# Patient Record
Sex: MALE | Race: WHITE | NOT HISPANIC OR LATINO | Employment: OTHER | URBAN - NONMETROPOLITAN AREA
[De-identification: names, ages, dates, MRNs, and addresses within clinical notes are randomized per-mention and may not be internally consistent; named-entity substitution may affect disease eponyms.]

---

## 2017-01-17 ENCOUNTER — OFFICE VISIT (OUTPATIENT)
Dept: CARDIOLOGY | Facility: CLINIC | Age: 67
End: 2017-01-17

## 2017-01-17 VITALS
WEIGHT: 241.8 LBS | OXYGEN SATURATION: 96 % | HEART RATE: 68 BPM | SYSTOLIC BLOOD PRESSURE: 128 MMHG | DIASTOLIC BLOOD PRESSURE: 78 MMHG | HEIGHT: 71 IN | BODY MASS INDEX: 33.85 KG/M2

## 2017-01-17 DIAGNOSIS — E78.2 MIXED HYPERLIPIDEMIA: ICD-10-CM

## 2017-01-17 DIAGNOSIS — Z79.899 ENCOUNTER FOR MONITORING SOTALOL THERAPY: ICD-10-CM

## 2017-01-17 DIAGNOSIS — E66.9 NON MORBID OBESITY, UNSPECIFIED OBESITY TYPE: ICD-10-CM

## 2017-01-17 DIAGNOSIS — I48.0 PAROXYSMAL A-FIB (HCC): Primary | ICD-10-CM

## 2017-01-17 DIAGNOSIS — Z51.81 ENCOUNTER FOR MONITORING SOTALOL THERAPY: ICD-10-CM

## 2017-01-17 DIAGNOSIS — I10 ESSENTIAL HYPERTENSION: ICD-10-CM

## 2017-01-17 PROCEDURE — 99214 OFFICE O/P EST MOD 30 MIN: CPT | Performed by: INTERNAL MEDICINE

## 2017-01-17 PROCEDURE — 93000 ELECTROCARDIOGRAM COMPLETE: CPT | Performed by: INTERNAL MEDICINE

## 2017-01-17 NOTE — PROGRESS NOTES
subjective     Chief Complaint   Patient presents with   • Atrial Fibrillation   • Hypertension   • Hyperlipidemia     History of Present Illness    Paroxysmal atrial fibrillation. Patient was diagnosed as having atrial fibrillation in [place] in 2006. Patient was started on sotalol therapy. No cardioversion was needed. He has been doing very well. He states that when he gets tired he starts feeling fluttering in the chest last episode of heart fluttering was about 2 weeks ago it lasts for 45 minutes and then returns back to normal. Patient's chads 2 VASC score is 3. There has been no syncopal episode denies any chest pain. His last stress test was in 2012 and was negative for significant exercise-induced myocardial ischemia. Echocardiogram showed normal LV functions there was no significant valvular heart disease LV ejection fraction was 65%. LV diastolic dysfunction was noted. RV systolic pressure was 40. At this time patient is asymptomatic      HYPERTENSION  Miki Jett has long-standing essential hypertension for years. he is taking medications regularly. There are no medication side effects. Blood pressure is very well controlled. There has been no headache nausea chest pain. There has been no syncopal or presyncopal episode. he denies episodes of hypo-tension or accelerated hypertension.    Hyperlipidemia  Miki Jett has long-standing history of hyperlipidemia. Has been trying to lose weight following diet and exercise. Patient is tolerating medications very well. There has been no side effects. Latest lipid levels have been good.    Patient Active Problem List   Diagnosis   • Paroxysmal a-fib February 2006 leg [facility] currently in sinus rhythm.[id number] 3   • Encounter for monitoring sotalol therapy   • Hypertension   • Hyperlipidemia   • Obesity       Social History   Substance Use Topics   • Smoking status: Former Smoker     Packs/day: 0.50     Years: 3.00     Types:  "Cigarettes     Quit date: 2006   • Smokeless tobacco: Former User     Types: Chew   • Alcohol use No       No Known Allergies      Current Outpatient Prescriptions:   •  apixaban (ELIQUIS) 5 MG tablet tablet, Take 1 tablet by mouth 2 (Two) Times a Day., Disp: 60 tablet, Rfl: 2  •  lisinopril (PRINIVIL,ZESTRIL) 10 MG tablet, Take 10 mg by mouth daily., Disp: , Rfl:   •  metFORMIN (GLUCOPHAGE) 500 MG tablet, Take 500 mg by mouth Daily With Breakfast., Disp: , Rfl:   •  sertraline (ZOLOFT) 100 MG tablet, Take 1 tablet by mouth Daily., Disp: 30 tablet, Rfl: 2  •  sildenafil (REVATIO) 20 MG tablet, Take 20 mg by mouth daily., Disp: , Rfl:   •  sotalol (BETAPACE) 80 MG tablet, Take 80 mg by mouth 2 (two) times a day., Disp: , Rfl:       The following portions of the patient's history were reviewed and updated as appropriate: allergies, current medications, past family history, past medical history, past social history, past surgical history and problem list.    Review of Systems   Constitution: Negative.   HENT: Negative.    Eyes: Negative.    Cardiovascular: Negative.    Respiratory: Negative.    Hematologic/Lymphatic: Negative.    Musculoskeletal: Negative.    Gastrointestinal: Negative.    Neurological: Negative.           Objective:     Visit Vitals   • /78 (BP Location: Left arm, Patient Position: Sitting)   • Pulse 68   • Ht 71\" (180.3 cm)   • Wt 241 lb 12.8 oz (110 kg)   • SpO2 96%   • BMI 33.72 kg/m2     Physical Exam   Constitutional: He appears well-developed and well-nourished.   HENT:   Head: Normocephalic and atraumatic.   Mouth/Throat: Oropharynx is clear and moist.   Eyes: Conjunctivae and EOM are normal. Pupils are equal, round, and reactive to light. No scleral icterus.   Neck: Normal range of motion. Neck supple. No JVD present. No tracheal deviation present. No thyromegaly present.   Cardiovascular: Normal rate, regular rhythm, normal heart sounds and intact distal pulses.  Exam reveals no " "friction rub.    No murmur heard.  Pulmonary/Chest: Effort normal and breath sounds normal. No respiratory distress. He has no wheezes. He has no rales. He exhibits no tenderness.   Abdominal: Soft. Bowel sounds are normal. He exhibits no distension and no mass. There is no tenderness. There is no rebound and no guarding.   Musculoskeletal: Normal range of motion. He exhibits no edema, tenderness or deformity.   Lymphadenopathy:     He has no cervical adenopathy.   Neurological: He is alert. He has normal reflexes. No cranial nerve deficit. He exhibits normal muscle tone. Coordination normal.   Skin: Skin is warm and dry.   Psychiatric: He has a normal mood and affect. His behavior is normal. Judgment and thought content normal.         Lab Review        ECG 12 Lead  Date/Time: 1/17/2017 12:59 PM  Performed by: NOLBERTO NICHOLSON  Authorized by: NOLBERTO NICHOLSON   Rhythm: sinus rhythm  Rate: normal  Conduction: conduction normal  ST Segments: ST segments normal  T Waves: T waves normal  QRS axis: normal  Other: no other findings  Clinical impression: normal ECG              I personally viewed and interpreted the patient's LAB data         Assessment:     1. Paroxysmal a-fib February 2006 leg [facility] currently in sinus rhythm.[id number] 3    2. Essential hypertension    3. Mixed hyperlipidemia    4. Non morbid obesity, unspecified obesity type    5. Encounter for monitoring sotalol therapy          Plan:      Patient is doing very well.  He walked 5 miles today and last week he walked 7.5 miles.  Patient is trying to \"train his dog.  Patient is very active and is asymptomatic.    Risk factor modification and further weight loss was stressed.  He will continue his current medications.    Blood pressure is normal  Patient is tolerating Betapace and maintaining in sinus rhythm  He is not having any problems with anticoagulation with eliquis.    The nature of cardiac risk has been fully " discussed with this patient. I have made him aware of his LDL target goal given his cardiovascular risk analysis. I have discussed the appropriate diet. The need for lifelong compliance in order to reduce risk is stressed. A regular exercise program is recommended to help achieve and maintain normal body weight, fitness and improve lipid balance.      No Follow-up on file.

## 2017-01-17 NOTE — LETTER
January 17, 2017     Blanka Grimm, [place]  [address] [place] [address]    Patient: Miki Jett   YOB: 1950   Date of Visit: 1/17/2017       Dear Dr. Grimm, [place]:    Thank you for referring Miki Jett to me for evaluation. Below are the relevant portions of my assessment and plan of care.    If you have questions, please do not hesitate to call me. I look forward to following Miki along with you.         Sincerely,        Mike Olson MD        CC: No Recipients  Mike Olson MD  1/17/2017  1:00 PM  Signed  subjective     Chief Complaint   Patient presents with   • Atrial Fibrillation   • Hypertension   • Hyperlipidemia     History of Present Illness    Paroxysmal atrial fibrillation. Patient was diagnosed as having atrial fibrillation in [place] in 2006. Patient was started on sotalol therapy. No cardioversion was needed. He has been doing very well. He states that when he gets tired he starts feeling fluttering in the chest last episode of heart fluttering was about 2 weeks ago it lasts for 45 minutes and then returns back to normal. Patient's chads 2 VASC score is 3. There has been no syncopal episode denies any chest pain. His last stress test was in 2012 and was negative for significant exercise-induced myocardial ischemia. Echocardiogram showed normal LV functions there was no significant valvular heart disease LV ejection fraction was 65%. LV diastolic dysfunction was noted. RV systolic pressure was 40. At this time patient is asymptomatic      HYPERTENSION  Miki Jett has long-standing essential hypertension for years. he is taking medications regularly. There are no medication side effects. Blood pressure is very well controlled. There has been no headache nausea chest pain. There has been no syncopal or presyncopal episode. he denies episodes of hypo-tension or accelerated hypertension.    Hyperlipidemia  Miki Jett has  "long-standing history of hyperlipidemia. Has been trying to lose weight following diet and exercise. Patient is tolerating medications very well. There has been no side effects. Latest lipid levels have been good.    Patient Active Problem List   Diagnosis   • Paroxysmal a-fib February 2006 leg [facility] currently in sinus rhythm.[id number] 3   • Encounter for monitoring sotalol therapy   • Hypertension   • Hyperlipidemia   • Obesity       Social History   Substance Use Topics   • Smoking status: Former Smoker     Packs/day: 0.50     Years: 3.00     Types: Cigarettes     Quit date: 2006   • Smokeless tobacco: Former User     Types: Chew   • Alcohol use No       No Known Allergies      Current Outpatient Prescriptions:   •  apixaban (ELIQUIS) 5 MG tablet tablet, Take 1 tablet by mouth 2 (Two) Times a Day., Disp: 60 tablet, Rfl: 2  •  lisinopril (PRINIVIL,ZESTRIL) 10 MG tablet, Take 10 mg by mouth daily., Disp: , Rfl:   •  metFORMIN (GLUCOPHAGE) 500 MG tablet, Take 500 mg by mouth Daily With Breakfast., Disp: , Rfl:   •  sertraline (ZOLOFT) 100 MG tablet, Take 1 tablet by mouth Daily., Disp: 30 tablet, Rfl: 2  •  sildenafil (REVATIO) 20 MG tablet, Take 20 mg by mouth daily., Disp: , Rfl:   •  sotalol (BETAPACE) 80 MG tablet, Take 80 mg by mouth 2 (two) times a day., Disp: , Rfl:       The following portions of the patient's history were reviewed and updated as appropriate: allergies, current medications, past family history, past medical history, past social history, past surgical history and problem list.    Review of Systems   Constitution: Negative.   HENT: Negative.    Eyes: Negative.    Cardiovascular: Negative.    Respiratory: Negative.    Hematologic/Lymphatic: Negative.    Musculoskeletal: Negative.    Gastrointestinal: Negative.    Neurological: Negative.           Objective:     Visit Vitals   • /78 (BP Location: Left arm, Patient Position: Sitting)   • Pulse 68   • Ht 71\" (180.3 " cm)   • Wt 241 lb 12.8 oz (110 kg)   • SpO2 96%   • BMI 33.72 kg/m2     Physical Exam   Constitutional: He appears well-developed and well-nourished.   HENT:   Head: Normocephalic and atraumatic.   Mouth/Throat: Oropharynx is clear and moist.   Eyes: Conjunctivae and EOM are normal. Pupils are equal, round, and reactive to light. No scleral icterus.   Neck: Normal range of motion. Neck supple. No JVD present. No tracheal deviation present. No thyromegaly present.   Cardiovascular: Normal rate, regular rhythm, normal heart sounds and intact distal pulses.  Exam reveals no friction rub.    No murmur heard.  Pulmonary/Chest: Effort normal and breath sounds normal. No respiratory distress. He has no wheezes. He has no rales. He exhibits no tenderness.   Abdominal: Soft. Bowel sounds are normal. He exhibits no distension and no mass. There is no tenderness. There is no rebound and no guarding.   Musculoskeletal: Normal range of motion. He exhibits no edema, tenderness or deformity.   Lymphadenopathy:     He has no cervical adenopathy.   Neurological: He is alert. He has normal reflexes. No cranial nerve deficit. He exhibits normal muscle tone. Coordination normal.   Skin: Skin is warm and dry.   Psychiatric: He has a normal mood and affect. His behavior is normal. Judgment and thought content normal.         Lab Review        ECG 12 Lead  Date/Time: 1/17/2017 12:59 PM  Performed by: NOLBERTO NICHOLSON  Authorized by: NOLBERTO NICHOLSON   Rhythm: sinus rhythm  Rate: normal  Conduction: conduction normal  ST Segments: ST segments normal  T Waves: T waves normal  QRS axis: normal  Other: no other findings  Clinical impression: normal ECG              I personally viewed and interpreted the patient's LAB data         Assessment:     1. Paroxysmal a-fib February 2006 leg [facility] currently in sinus rhythm.[id number] 3    2. Essential hypertension    3. Mixed hyperlipidemia    4. Non morbid  "obesity, unspecified obesity type    5. Encounter for monitoring sotalol therapy          Plan:      Patient is doing very well.  He walked 5 miles today and last week he walked 7.5 miles.  Patient is trying to \"train his dog.  Patient is very active and is asymptomatic.    Risk factor modification and further weight loss was stressed.  He will continue his current medications.    Blood pressure is normal  Patient is tolerating Betapace and maintaining in sinus rhythm  He is not having any problems with anticoagulation with eliquis.    The nature of cardiac risk has been fully discussed with this patient. I have made him aware of his LDL target goal given his cardiovascular risk analysis. I have discussed the appropriate diet. The need for lifelong compliance in order to reduce risk is stressed. A regular exercise program is recommended to help achieve and maintain normal body weight, fitness and improve lipid balance.      No Follow-up on file.  "

## 2017-01-17 NOTE — MR AVS SNAPSHOT
Miki Jett   1/17/2017 8:30 AM   Office Visit    Dept Phone:  [phone]   Encounter #:  [id number]    Provider:  Mike Olson MD   Department:  [facility] CARDIOLOGY                Your Full Care Plan              Today's Medication Changes          These changes are accurate as of: 1/17/17  9:44 AM.  If you have any questions, ask your nurse or doctor.               Medication(s)that have changed:     metFORMIN 500 MG tablet   Commonly known as:  GLUCOPHAGE   Take 500 mg by mouth Daily With Breakfast.   What changed:  Another medication with the same name was removed. Continue taking this medication, and follow the directions you see here.   Changed by:  Mike Olson MD         Stop taking medication(s)listed here:     fluticasone 50 MCG/ACT nasal spray   Commonly known as:  FLONASE   Stopped by:  Mike Olson MD                      Your Updated Medication List          This list is accurate as of: 1/17/17  9:44 AM.  Always use your most recent med list.                apixaban 5 MG tablet tablet   Commonly known as:  ELIQUIS   Take 1 tablet by mouth 2 (Two) Times a Day.       lisinopril 10 MG tablet   Commonly known as:  PRINIVIL,ZESTRIL       metFORMIN 500 MG tablet   Commonly known as:  GLUCOPHAGE       REVATIO 20 MG tablet   Generic drug:  sildenafil       sertraline 100 MG tablet   Commonly known as:  ZOLOFT   Take 1 tablet by mouth Daily.       sotalol 80 MG tablet   Commonly known as:  BETAPACE               We Performed the Following     ECG 12 Lead       You Were Diagnosed With        Codes Comments    Paroxysmal a-fib    -  Primary ICD-10-CM: I48.0  ICD-9-CM: 427.31     Essential hypertension     ICD-10-CM: I10  ICD-9-CM: 401.9     Mixed hyperlipidemia     ICD-10-CM: E78.2  ICD-9-CM: 272.2     Non morbid obesity, unspecified obesity type     ICD-10-CM: E66.9  ICD-9-CM: 278.00     Encounter for monitoring sotalol therapy     " ICD-10-CM: Z51.81, Z79.899  ICD-9-CM: V58.83, V58.69       Instructions     None    Patient Instructions History      Upcoming Appointments     Visit Type Date Time Department    FOLLOW UP 1/17/2017  8:30 AM [facility] CARDIOLOGY [place]    FOLLOW UP 6/27/2017  9:00 AM [facility] CARDIOLOGY [place]      MyChart Signup     Our records indicate that you have declined [facility] sign[facility]. If you would like to sign up for [facility], please email [email or web address] or call [phone] to obtain an activation code.             Other Info from Your Visit           Your Appointments     Jun 27, 2017  9:00 AM EDT   Follow Up with Mike Olson MD   [facility] CARDIOLOGY (--)    [address] [place] [address] [place]  [place] [address] [phone]           Arrive 15 minutes prior to appointment.              Allergies     No Known Allergies      Reason for Visit     Atrial Fibrillation     Hypertension     Hyperlipidemia           Vital Signs     Blood Pressure Pulse Height Weight Oxygen Saturation Body Mass Index    128/78 (BP Location: Left arm, Patient Position: Sitting) 68 71\" (180.3 cm) 241 lb 12.8 oz (110 kg) 96% 33.72 kg/m2    Smoking Status                   Former Smoker           Problems and Diagnoses Noted     Encounter for monitoring sotalol therapy    High cholesterol or triglycerides    High blood pressure    Obesity    Atrial fibrillation (irregular heartbeat)        "

## 2017-03-16 NOTE — TELEPHONE ENCOUNTER
Request rx.   Patient was last seen in October and no showed for his appointment in January 2017 with no pending appointments.

## 2017-03-17 ENCOUNTER — TELEPHONE (OUTPATIENT)
Dept: PSYCHIATRY | Facility: CLINIC | Age: 67
End: 2017-03-17

## 2017-03-20 RX ORDER — SERTRALINE HYDROCHLORIDE 100 MG/1
100 TABLET, FILM COATED ORAL DAILY
Qty: 30 TABLET | Refills: 2 | Status: SHIPPED | OUTPATIENT
Start: 2017-03-20 | End: 2017-06-16 | Stop reason: SDUPTHER

## 2017-03-27 ENCOUNTER — OFFICE VISIT (OUTPATIENT)
Dept: PSYCHIATRY | Facility: CLINIC | Age: 67
End: 2017-03-27

## 2017-03-27 VITALS
DIASTOLIC BLOOD PRESSURE: 83 MMHG | HEART RATE: 74 BPM | WEIGHT: 245 LBS | HEIGHT: 71 IN | BODY MASS INDEX: 34.3 KG/M2 | SYSTOLIC BLOOD PRESSURE: 133 MMHG

## 2017-03-27 DIAGNOSIS — F41.1 GENERALIZED ANXIETY DISORDER: Primary | ICD-10-CM

## 2017-03-27 PROCEDURE — 99212 OFFICE O/P EST SF 10 MIN: CPT | Performed by: PSYCHIATRY & NEUROLOGY

## 2017-03-27 NOTE — PROGRESS NOTES
"Subjective   Patient ID: Miki Jett is a 66 y.o. male is here today for follow-up.     /83  Pulse 74  Ht 71\" (180.3 cm)  Wt 245 lb (111 kg)  BMI 34.17 kg/m2    Review of patient's allergies indicates no known allergies.    History of Present Illness The patient states that he is feeling good and his mood has been stable. He states that she keeps himself busy hunting and fishing and is enjoying his [facility]. Reports compliance with his medication and no side effects.    The following portions of the patient's history were reviewed and updated as appropriate: allergies, current medications and problem list.    Review of Systems   Constitutional: Negative.    HENT: Negative.    Eyes: Negative.    Respiratory: Negative.    Cardiovascular: Negative.    Gastrointestinal: Negative.        Objective   Mental Status Exam  Appearance:  clean and casually dressed, appropriate  Attitude toward clinician:  cooperative and agreeable   Speech:    Rate:  regular rate and rhythm   Volume:  normal  Motor:  no abnormal movements present  Mood:  Good  Affect:  euthymic  Thought Processes:  linear, logical, and goal directed  Thought Content:  normal  Suicidal Thoughts:  absent  Homicidal Thoughts:  absent  Perceptual Disturbance: no perceptual disturbance  Attention and Concentration:  good  Insight and Judgement:  good  Memory:  memory appears to be intact    MEDICATION ISSUES:  Current Outpatient Prescriptions on File Prior to Visit   Medication Sig Dispense Refill   • apixaban (ELIQUIS) 5 MG tablet tablet Take 1 tablet by mouth 2 (Two) Times a Day. 60 tablet 2   • lisinopril (PRINIVIL,ZESTRIL) 10 MG tablet Take 10 mg by mouth daily.     • metFORMIN (GLUCOPHAGE) 500 MG tablet Take 500 mg by mouth Daily With Breakfast.     • sertraline (ZOLOFT) 100 MG tablet Take 1 tablet by mouth Daily. 30 tablet 2   • sotalol (BETAPACE) 80 MG tablet Take 80 mg by mouth 2 (two) times a day.     • [DISCONTINUED] sildenafil (REVATIO) " 20 MG tablet Take 20 mg by mouth daily.       No current facility-administered medications on file prior to visit.          Lab Review:   No visits with results within 2 Month(s) from this visit.  Latest known visit with results is:    Admission on 03/02/2015, Discharged on 03/02/2015   Component Date Value   • Sodium 03/02/2015 140    • Potassium 03/02/2015 4.6    • Chloride 03/02/2015 105    • CO2 03/02/2015 25.2    • Glucose 03/02/2015 126*   • BUN 03/02/2015 22*   • Creatinine 03/02/2015 0.84    • Calcium 03/02/2015 9.7    • AST (SGOT) 03/02/2015 21    • Alkaline Phosphatase 03/02/2015 83    • Total Bilirubin 03/02/2015 0.3    • ALT (SGPT) 03/02/2015 25    • A/G Ratio 03/02/2015 1.3*   • Osmolality 03/02/2015 284    • BUN/Creatinine Ratio 03/02/2015 26.5    • Anion Gap 03/02/2015 9.8    • Albumin 03/02/2015 4.2    • Total Protein 03/02/2015 7.4    • eGFR 03/02/2015 97    • Fasting? 03/02/2015 YES    • Total Cholesterol 03/02/2015 166    • Triglycerides 03/02/2015 324*   • HDL Cholesterol 03/02/2015 38*   • VLDL Cholesterol 03/02/2015 65*   • LDL Cholesterol  03/02/2015 63    • TSH 03/02/2015 3.282    • WBC 03/02/2015 9.3    • RBC 03/02/2015 4.87    • Hemoglobin 03/02/2015 14.4    • Hematocrit 03/02/2015 43.9    • MCV 03/02/2015 90.1    • MCH 03/02/2015 29.6    • MCHC 03/02/2015 32.8*   • Platelets 03/02/2015 223    • RDW 03/02/2015 14.7*   • MPV 03/02/2015 10.6*   • Neutrophil Rel % 03/02/2015 69.0    • Lymphocyte Rel % 03/02/2015 16.5    • Monocyte Rel % 03/02/2015 12.2*   • Eosinophil Rel % 03/02/2015 1.7    • Basophil Rel % 03/02/2015 0.3    • Immature Granulocyte Rel* 03/02/2015 0.30    • Neutrophils Absolute 03/02/2015 6.4    • Lymphocytes Absolute 03/02/2015 1.5    • Monocytes Absolute 03/02/2015 1.1*   • Eosinophils Absolute 03/02/2015 0.2    • Basophils Absolute 03/02/2015 0.0    • Abs Imm Gran 03/02/2015 0.030      Assessment/Plan   Diagnoses and all orders for this visit:    Generalized anxiety  disorder    Return in about 3 months (around 6/27/2017).

## 2017-06-16 RX ORDER — SERTRALINE HYDROCHLORIDE 100 MG/1
100 TABLET, FILM COATED ORAL DAILY
Qty: 30 TABLET | Refills: 2 | Status: SHIPPED | OUTPATIENT
Start: 2017-06-16 | End: 2017-07-24 | Stop reason: SDUPTHER

## 2017-07-14 ENCOUNTER — OFFICE VISIT (OUTPATIENT)
Dept: CARDIOLOGY | Facility: CLINIC | Age: 67
End: 2017-07-14

## 2017-07-14 VITALS
RESPIRATION RATE: 18 BRPM | WEIGHT: 239 LBS | DIASTOLIC BLOOD PRESSURE: 70 MMHG | SYSTOLIC BLOOD PRESSURE: 110 MMHG | OXYGEN SATURATION: 95 % | BODY MASS INDEX: 33.46 KG/M2 | HEART RATE: 75 BPM | HEIGHT: 71 IN

## 2017-07-14 DIAGNOSIS — Z51.81 ENCOUNTER FOR MONITORING SOTALOL THERAPY: ICD-10-CM

## 2017-07-14 DIAGNOSIS — E66.9 NON MORBID OBESITY, UNSPECIFIED OBESITY TYPE: ICD-10-CM

## 2017-07-14 DIAGNOSIS — I48.0 PAROXYSMAL A-FIB (HCC): Primary | ICD-10-CM

## 2017-07-14 DIAGNOSIS — Z79.899 ENCOUNTER FOR MONITORING SOTALOL THERAPY: ICD-10-CM

## 2017-07-14 DIAGNOSIS — I10 ESSENTIAL HYPERTENSION: ICD-10-CM

## 2017-07-14 PROCEDURE — 99213 OFFICE O/P EST LOW 20 MIN: CPT | Performed by: INTERNAL MEDICINE

## 2017-07-14 RX ORDER — LORAZEPAM 1 MG/1
TABLET ORAL
COMMUNITY
Start: 2017-04-11 | End: 2021-12-01

## 2017-07-14 NOTE — PROGRESS NOTES
subjective     Chief Complaint   Patient presents with   • Hypertension   • Hyperlipidemia   • Atrial Fibrillation     History of Present Illness  Paroxysmal atrial fibrillation. Patient was diagnosed as having atrial fibrillation in [place] in 2006. Patient was started on sotalol therapy. No cardioversion was needed. He has been doing very well. He states that when he gets tired he starts feeling fluttering in the chest last episode of heart fluttering was about 2 weeks ago it lasts for 45 minutes and then returns back to normal. Patient's chads 2 VASC score is 3. There has been no syncopal episode denies any chest pain. His last stress test was in 2012 and was negative for significant exercise-induced myocardial ischemia. Echocardiogram showed normal LV functions there was no significant valvular heart disease LV ejection fraction was 65%. LV diastolic dysfunction was noted. RV systolic pressure was 40. At this time patient is asymptomatic.    Chronic anticoagulation  Patient has paroxysmal atrial fibrillation currently in sinus rhythm.  Chads 2 VASC score is 3.  Patient is using eliquis 5 twice a day without any side effects.       HYPERTENSION  Miki Jett has long-standing essential hypertension for years. he is taking medications regularly. There are no medication side effects. Blood pressure is very well controlled. There has been no headache nausea chest pain. There has been no syncopal or presyncopal episode. he denies episodes of hypo-tension or accelerated hypertension.     Hyperlipidemia  Miki Jett has long-standing history of hyperlipidemia. Has been trying to lose weight following diet and exercise. Patient is tolerating medications very well. There has been no side effects. Latest lipid levels have been good.    Past Surgical History:   Procedure Laterality Date   • CARDIOVASCULAR STRESS TEST  2012   • CATARACT EXTRACTION     • ECHO - CONVERTED  2012     Family History   Problem  Relation Age of Onset   • No Known Problems Mother    • Lung cancer Father    • Heart disease Sister      Past Medical History:   Diagnosis Date   • Anxiety    • Encounter for monitoring sotalol therapy    • Hyperlipidemia    • Hypertension    • Obesity    • Paroxysmal a-fib      Patient Active Problem List   Diagnosis   • Paroxysmal a-fib February 2006 leg [facility] Epifanio currently in sinus rhythm.[id number]   • Encounter for monitoring sotalol therapy   • Hypertension   • Hyperlipidemia   • Obesity       Social History   Substance Use Topics   • Smoking status: Former Smoker     Packs/day: 0.50     Years: 3.00     Types: Cigarettes     Quit date: 2006   • Smokeless tobacco: Former User     Types: Chew   • Alcohol use No       No Known Allergies    Current Outpatient Prescriptions on File Prior to Visit   Medication Sig   • apixaban (ELIQUIS) 5 MG tablet tablet Take 1 tablet by mouth 2 (Two) Times a Day.   • lisinopril (PRINIVIL,ZESTRIL) 10 MG tablet Take 10 mg by mouth daily.   • metFORMIN (GLUCOPHAGE) 500 MG tablet Take 500 mg by mouth Daily With Breakfast.   • sertraline (ZOLOFT) 100 MG tablet Take 1 tablet by mouth Daily.   • sotalol (BETAPACE) 80 MG tablet Take 80 mg by mouth 2 (two) times a day.     No current facility-administered medications on file prior to visit.          The following portions of the patient's history were reviewed and updated as appropriate: allergies, current medications, past family history, past medical history, past social history, past surgical history and problem list.    Review of Systems   Constitution: Negative.   HENT: Negative.  Negative for congestion and headaches.    Eyes: Negative.    Cardiovascular: Negative.  Negative for chest pain, cyanosis, dyspnea on exertion, irregular heartbeat, leg swelling, near-syncope, orthopnea, palpitations, paroxysmal nocturnal dyspnea and syncope.   Respiratory: Negative.  Negative for shortness of breath.   "  Hematologic/Lymphatic: Negative.    Musculoskeletal: Negative.    Gastrointestinal: Negative.    Neurological: Negative.           Objective:     /70 (BP Location: Left arm, Patient Position: Sitting, Cuff Size: Adult)  Pulse 75  Resp 18  Ht 71\" (180.3 cm)  Wt 239 lb (108 kg)  SpO2 95%  BMI 33.33 kg/m2  Physical Exam   Constitutional: He appears well-developed and well-nourished.   HENT:   Head: Normocephalic and atraumatic.   Mouth/Throat: Oropharynx is clear and moist.   Eyes: Conjunctivae and EOM are normal. Pupils are equal, round, and reactive to light. No scleral icterus.   Neck: Normal range of motion. Neck supple. No JVD present. No tracheal deviation present. No thyromegaly present.   Cardiovascular: Normal rate, regular rhythm, normal heart sounds and intact distal pulses.  Exam reveals no friction rub.    No murmur heard.  Pulmonary/Chest: Effort normal and breath sounds normal. No respiratory distress. He has no wheezes. He has no rales. He exhibits no tenderness.   Abdominal: Soft. Bowel sounds are normal. He exhibits no distension and no mass. There is no tenderness. There is no rebound and no guarding.   Musculoskeletal: Normal range of motion. He exhibits no edema, tenderness or deformity.   Lymphadenopathy:     He has no cervical adenopathy.   Neurological: He is alert. He has normal reflexes. No cranial nerve deficit. He exhibits normal muscle tone. Coordination normal.   Skin: Skin is warm and dry.   Psychiatric: He has a normal mood and affect. His behavior is normal. Judgment and thought content normal.         Lab Review  Lab work as of December 5, 2016  Normal CBC  Cholesterol 142 triglyceride 209 HDL 39 and LDL 61  Blood sugar 168 otherwise CBC CMP normal    Procedures       I personally viewed and interpreted the patient's LAB data         Assessment:     1. Paroxysmal a-fib February 2006 leg [facility] currently in sinus rhythm.[id number]    2. Essential " hypertension    3. Non morbid obesity, unspecified obesity type    4. Encounter for monitoring sotalol therapy          Plan:      Patient is planning to have colonoscopy.  He was advised to stop eliquis 3 days prior to colonoscopy and he can resume when it is okay with the gastroenterologist.    Patient needs to lose weight healthy lifestyle discussed.    Blood pressure is very well controlled.    Lab work discussed with the patient cholesterol is acceptable but sugar and triglycerides are high dietary restriction discussed weight loss exercise program healthy lifestyle emphasized.  Follow-up in 6 months Will get a copy of labs from PCP    Return in about 6 months (around 1/14/2018).

## 2017-07-24 ENCOUNTER — OFFICE VISIT (OUTPATIENT)
Dept: PSYCHIATRY | Facility: CLINIC | Age: 67
End: 2017-07-24

## 2017-07-24 VITALS
HEIGHT: 71 IN | WEIGHT: 239 LBS | HEART RATE: 72 BPM | DIASTOLIC BLOOD PRESSURE: 75 MMHG | BODY MASS INDEX: 33.46 KG/M2 | SYSTOLIC BLOOD PRESSURE: 119 MMHG

## 2017-07-24 DIAGNOSIS — F41.1 GENERALIZED ANXIETY DISORDER: Primary | ICD-10-CM

## 2017-07-24 PROCEDURE — 99212 OFFICE O/P EST SF 10 MIN: CPT | Performed by: PSYCHIATRY & NEUROLOGY

## 2017-07-24 RX ORDER — SERTRALINE HYDROCHLORIDE 100 MG/1
100 TABLET, FILM COATED ORAL DAILY
Qty: 30 TABLET | Refills: 2 | Status: SHIPPED | OUTPATIENT
Start: 2017-07-24 | End: 2017-10-23 | Stop reason: SDUPTHER

## 2017-07-24 NOTE — PROGRESS NOTES
"Subjective   Patient ID: Miki Jett is a 66 y.o. male is here today for follow-up.     /75  Pulse 72  Ht 71\" (180.3 cm)  Wt 239 lb (108 kg)  BMI 33.33 kg/m2    Review of patient's allergies indicates no known allergies.    History of Present Illness The patient states that he is doing well and his mood has been good and stable. Reports compliance with his medication and no side effects.    The following portions of the patient's history were reviewed and updated as appropriate: allergies, current medications and problem list.    Review of Systems   Constitutional: Negative.    HENT: Negative.    Eyes: Negative.    Respiratory: Negative.    Cardiovascular: Negative.    Gastrointestinal: Negative.        Objective   Mental Status Exam  Appearance:  clean and casually dressed, appropriate  Attitude toward clinician:  cooperative and agreeable   Speech:    Rate:  regular rate and rhythm   Volume:  normal  Motor:  no abnormal movements present  Mood:  Good  Affect:  euthymic  Thought Processes:  linear, logical, and goal directed  Thought Content:  normal  Suicidal Thoughts:  absent  Homicidal Thoughts:  absent  Perceptual Disturbance: no perceptual disturbance  Attention and Concentration:  good  Insight and Judgement:  good  Memory:  memory appears to be intact    MEDICATION ISSUES:  Current Outpatient Prescriptions on File Prior to Visit   Medication Sig Dispense Refill   • apixaban (ELIQUIS) 5 MG tablet tablet Take 1 tablet by mouth 2 (Two) Times a Day. 60 tablet 2   • lisinopril (PRINIVIL,ZESTRIL) 10 MG tablet Take 10 mg by mouth daily.     • LORazepam (ATIVAN) 1 MG tablet      • metFORMIN (GLUCOPHAGE) 500 MG tablet Take 500 mg by mouth Daily With Breakfast.     • sotalol (BETAPACE) 80 MG tablet Take 80 mg by mouth 2 (two) times a day.     • [DISCONTINUED] sertraline (ZOLOFT) 100 MG tablet Take 1 tablet by mouth Daily. 30 tablet 2     No current facility-administered medications on file prior to " visit.          Lab Review:   No visits with results within 2 Month(s) from this visit.  Latest known visit with results is:    Admission on 03/02/2015, Discharged on 03/02/2015   Component Date Value   • Sodium 03/02/2015 140    • Potassium 03/02/2015 4.6    • Chloride 03/02/2015 105    • CO2 03/02/2015 25.2    • Glucose 03/02/2015 126*   • BUN 03/02/2015 22*   • Creatinine 03/02/2015 0.84    • Calcium 03/02/2015 9.7    • AST (SGOT) 03/02/2015 21    • Alkaline Phosphatase 03/02/2015 83    • Total Bilirubin 03/02/2015 0.3    • ALT (SGPT) 03/02/2015 25    • A/G Ratio 03/02/2015 1.3*   • Osmolality 03/02/2015 284    • BUN/Creatinine Ratio 03/02/2015 26.5    • Anion Gap 03/02/2015 9.8    • Albumin 03/02/2015 4.2    • Total Protein 03/02/2015 7.4    • eGFR 03/02/2015 97    • Fasting? 03/02/2015 YES    • Total Cholesterol 03/02/2015 166    • Triglycerides 03/02/2015 324*   • HDL Cholesterol 03/02/2015 38*   • VLDL Cholesterol 03/02/2015 65*   • LDL Cholesterol  03/02/2015 63    • TSH 03/02/2015 3.282    • WBC 03/02/2015 9.3    • RBC 03/02/2015 4.87    • Hemoglobin 03/02/2015 14.4    • Hematocrit 03/02/2015 43.9    • MCV 03/02/2015 90.1    • MCH 03/02/2015 29.6    • MCHC 03/02/2015 32.8*   • Platelets 03/02/2015 223    • RDW 03/02/2015 14.7*   • MPV 03/02/2015 10.6*   • Neutrophil Rel % 03/02/2015 69.0    • Lymphocyte Rel % 03/02/2015 16.5    • Monocyte Rel % 03/02/2015 12.2*   • Eosinophil Rel % 03/02/2015 1.7    • Basophil Rel % 03/02/2015 0.3    • Immature Granulocyte Rel* 03/02/2015 0.30    • Neutrophils Absolute 03/02/2015 6.4    • Lymphocytes Absolute 03/02/2015 1.5    • Monocytes Absolute 03/02/2015 1.1*   • Eosinophils Absolute 03/02/2015 0.2    • Basophils Absolute 03/02/2015 0.0    • Abs Imm Gran 03/02/2015 0.030      Assessment/Plan   Diagnoses and all orders for this visit:    Generalized anxiety disorder    Other orders  -     sertraline (ZOLOFT) 100 MG tablet; Take 1 tablet by mouth Daily.    Return in about  3 months (around 10/24/2017).

## 2017-10-23 RX ORDER — SERTRALINE HYDROCHLORIDE 100 MG/1
100 TABLET, FILM COATED ORAL DAILY
Qty: 30 TABLET | Refills: 2 | Status: SHIPPED | OUTPATIENT
Start: 2017-10-23 | End: 2017-12-18 | Stop reason: SDUPTHER

## 2017-12-18 ENCOUNTER — OFFICE VISIT (OUTPATIENT)
Dept: PSYCHIATRY | Facility: CLINIC | Age: 67
End: 2017-12-18

## 2017-12-18 VITALS
WEIGHT: 237 LBS | SYSTOLIC BLOOD PRESSURE: 113 MMHG | DIASTOLIC BLOOD PRESSURE: 70 MMHG | BODY MASS INDEX: 33.18 KG/M2 | HEART RATE: 94 BPM | HEIGHT: 71 IN

## 2017-12-18 DIAGNOSIS — F41.1 GENERALIZED ANXIETY DISORDER: Primary | ICD-10-CM

## 2017-12-18 PROCEDURE — 99212 OFFICE O/P EST SF 10 MIN: CPT | Performed by: PSYCHIATRY & NEUROLOGY

## 2017-12-18 RX ORDER — SERTRALINE HYDROCHLORIDE 100 MG/1
100 TABLET, FILM COATED ORAL DAILY
Qty: 30 TABLET | Refills: 2 | Status: SHIPPED | OUTPATIENT
Start: 2017-12-18 | End: 2018-03-14 | Stop reason: SDUPTHER

## 2017-12-18 NOTE — PROGRESS NOTES
"Subjective   Patient ID: Miki Jett is a 67 y.o. male is here today for follow-up.     /70  Pulse 94  Ht 180 cm (70.87\")  Wt 108 kg (237 lb)  BMI 33.18 kg/m2    Review of patient's allergies indicates no known allergies.    History of Present Illness The patient returns for a scheduled appointment. He states that he is feeling good and his mood has been stable and reports no acute problems at this time. He reports compliance with his medication and no side effects.    The following portions of the patient's history were reviewed and updated as appropriate: allergies, current medications and problem list.    Review of Systems   Constitutional: Negative.    HENT: Negative.    Eyes: Negative.    Respiratory: Negative.    Cardiovascular: Negative.    Gastrointestinal: Negative.        Objective   Mental Status Exam  Appearance:  clean and casually dressed, appropriate  Attitude toward clinician:  cooperative and agreeable   Speech:    Rate:  regular rate and rhythm   Volume:  normal  Motor:  no abnormal movements present  Mood:  Good  Affect:  euthymic  Thought Processes:  linear, logical, and goal directed  Thought Content:  normal  Suicidal Thoughts:  absent  Homicidal Thoughts:  absent  Perceptual Disturbance: no perceptual disturbance  Attention and Concentration:  good  Insight and Judgement:  good  Memory:  memory appears to be intact    MEDICATION ISSUES:  Current Outpatient Prescriptions on File Prior to Visit   Medication Sig Dispense Refill   • apixaban (ELIQUIS) 5 MG tablet tablet Take 1 tablet by mouth 2 (Two) Times a Day. 60 tablet 2   • lisinopril (PRINIVIL,ZESTRIL) 10 MG tablet Take 10 mg by mouth daily.     • LORazepam (ATIVAN) 1 MG tablet      • metFORMIN (GLUCOPHAGE) 500 MG tablet Take 500 mg by mouth Daily With Breakfast.     • sotalol (BETAPACE) 80 MG tablet Take 80 mg by mouth 2 (two) times a day.     • [DISCONTINUED] sertraline (ZOLOFT) 100 MG tablet Take 1 tablet by mouth Daily. " 30 tablet 2     No current facility-administered medications on file prior to visit.          Lab Review:   No visits with results within 2 Month(s) from this visit.  Latest known visit with results is:    Admission on 03/02/2015, Discharged on 03/02/2015   Component Date Value   • Sodium 03/02/2015 140    • Potassium 03/02/2015 4.6    • Chloride 03/02/2015 105    • CO2 03/02/2015 25.2    • Glucose 03/02/2015 126*   • BUN 03/02/2015 22*   • Creatinine 03/02/2015 0.84    • Calcium 03/02/2015 9.7    • AST (SGOT) 03/02/2015 21    • Alkaline Phosphatase 03/02/2015 83    • Total Bilirubin 03/02/2015 0.3    • ALT (SGPT) 03/02/2015 25    • A/G Ratio 03/02/2015 1.3*   • Osmolality 03/02/2015 284    • BUN/Creatinine Ratio 03/02/2015 26.5    • Anion Gap 03/02/2015 9.8    • Albumin 03/02/2015 4.2    • Total Protein 03/02/2015 7.4    • eGFR 03/02/2015 97    • Fasting? 03/02/2015 YES    • Total Cholesterol 03/02/2015 166    • Triglycerides 03/02/2015 324*   • HDL Cholesterol 03/02/2015 38*   • VLDL Cholesterol 03/02/2015 65*   • LDL Cholesterol  03/02/2015 63    • TSH 03/02/2015 3.282    • WBC 03/02/2015 9.3    • RBC 03/02/2015 4.87    • Hemoglobin 03/02/2015 14.4    • Hematocrit 03/02/2015 43.9    • MCV 03/02/2015 90.1    • MCH 03/02/2015 29.6    • MCHC 03/02/2015 32.8*   • Platelets 03/02/2015 223    • RDW 03/02/2015 14.7*   • MPV 03/02/2015 10.6*   • Neutrophil Rel % 03/02/2015 69.0    • Lymphocyte Rel % 03/02/2015 16.5    • Monocyte Rel % 03/02/2015 12.2*   • Eosinophil Rel % 03/02/2015 1.7    • Basophil Rel % 03/02/2015 0.3    • Immature Granulocyte Rel* 03/02/2015 0.30    • Neutrophils Absolute 03/02/2015 6.4    • Lymphocytes Absolute 03/02/2015 1.5    • Monocytes Absolute 03/02/2015 1.1*   • Eosinophils Absolute 03/02/2015 0.2    • Basophils Absolute 03/02/2015 0.0    • Abs Imm Gran 03/02/2015 0.030      Assessment/Plan   Diagnoses and all orders for this visit:    Generalized anxiety disorder    Other orders  -      sertraline (ZOLOFT) 100 MG tablet; Take 1 tablet by mouth Daily.      Return in about 3 months (around 3/18/2018).

## 2018-01-23 ENCOUNTER — OFFICE VISIT (OUTPATIENT)
Dept: CARDIOLOGY | Facility: CLINIC | Age: 68
End: 2018-01-23

## 2018-01-23 VITALS
SYSTOLIC BLOOD PRESSURE: 128 MMHG | HEART RATE: 77 BPM | DIASTOLIC BLOOD PRESSURE: 82 MMHG | WEIGHT: 237.4 LBS | HEIGHT: 71 IN | BODY MASS INDEX: 33.23 KG/M2 | RESPIRATION RATE: 18 BRPM | OXYGEN SATURATION: 94 %

## 2018-01-23 DIAGNOSIS — Z51.81 ENCOUNTER FOR MONITORING SOTALOL THERAPY: Primary | ICD-10-CM

## 2018-01-23 DIAGNOSIS — Z79.899 ENCOUNTER FOR MONITORING SOTALOL THERAPY: Primary | ICD-10-CM

## 2018-01-23 DIAGNOSIS — I48.0 PAROXYSMAL A-FIB (HCC): ICD-10-CM

## 2018-01-23 DIAGNOSIS — E66.09 CLASS 1 OBESITY DUE TO EXCESS CALORIES WITHOUT SERIOUS COMORBIDITY WITH BODY MASS INDEX (BMI) OF 33.0 TO 33.9 IN ADULT: ICD-10-CM

## 2018-01-23 PROCEDURE — 93000 ELECTROCARDIOGRAM COMPLETE: CPT | Performed by: INTERNAL MEDICINE

## 2018-01-23 PROCEDURE — 99213 OFFICE O/P EST LOW 20 MIN: CPT | Performed by: INTERNAL MEDICINE

## 2018-01-23 RX ORDER — CALCIUM CITRATE/VITAMIN D3 200MG-6.25
TABLET ORAL
COMMUNITY
Start: 2017-12-19

## 2018-01-23 RX ORDER — METFORMIN HYDROCHLORIDE 500 MG/1
TABLET, EXTENDED RELEASE ORAL
COMMUNITY
Start: 2018-01-05 | End: 2019-03-28 | Stop reason: SDUPTHER

## 2018-01-23 RX ORDER — GLUCOSAM/CHON-MSM1/C/MANG/BOSW 500-416.6
TABLET ORAL
COMMUNITY
Start: 2017-12-19

## 2018-01-23 NOTE — PROGRESS NOTES
subjective     Chief Complaint   Patient presents with   • Hypertension   • Hyperlipidemia   • Proxsymal Afib     History of Present Illness    Patient is 67 years old white male who has history of paroxysmal atrial fibrillation.  He is being maintained in sinus rhythm with sotalol therapy.  Patient has no drug side effects.    He is also anticoagulated with eliquis 5 mg twice a day.  No drug side effects or complications noted.  He has chads 2 VASC score of 3    The other problems consist of hypertension and obesity and diabetes mellitus.  He is following closely with his PCP.    Past Surgical History:   Procedure Laterality Date   • CARDIOVASCULAR STRESS TEST  2012   • CATARACT EXTRACTION     • ECHO - CONVERTED  2012     Family History   Problem Relation Age of Onset   • No Known Problems Mother    • Lung cancer Father    • Heart disease Sister      Past Medical History:   Diagnosis Date   • Anxiety    • Encounter for monitoring sotalol therapy    • Hyperlipidemia    • Hypertension    • Obesity    • Paroxysmal a-fib      Patient Active Problem List   Diagnosis   • Paroxysmal a-fib February 2006 leg [facility] currently in sinus rhythm.[id number]   • Encounter for monitoring sotalol therapy   • Hypertension   • Hyperlipidemia   • Obesity       Social History   Substance Use Topics   • Smoking status: Former Smoker     Packs/day: 0.50     Years: 3.00     Types: Cigarettes     Quit date: 2006   • Smokeless tobacco: Former User     Types: Chew   • Alcohol use No       No Known Allergies    Current Outpatient Prescriptions on File Prior to Visit   Medication Sig   • apixaban (ELIQUIS) 5 MG tablet tablet Take 1 tablet by mouth 2 (Two) Times a Day.   • lisinopril (PRINIVIL,ZESTRIL) 10 MG tablet Take 10 mg by mouth daily.   • LORazepam (ATIVAN) 1 MG tablet    • sertraline (ZOLOFT) 100 MG tablet Take 1 tablet by mouth Daily.   • sotalol (BETAPACE) 80 MG tablet Take 80 mg by mouth 2 (two) times a day.  "  • metFORMIN (GLUCOPHAGE) 500 MG tablet Take 500 mg by mouth Daily With Breakfast.     No current facility-administered medications on file prior to visit.          The following portions of the patient's history were reviewed and updated as appropriate: allergies, current medications, past family history, past medical history, past social history, past surgical history and problem list.    Review of Systems   Constitution: Negative.   HENT: Negative.  Negative for congestion.    Eyes: Negative.    Cardiovascular: Negative.  Negative for chest pain, cyanosis, dyspnea on exertion, irregular heartbeat, leg swelling, near-syncope, orthopnea, palpitations, paroxysmal nocturnal dyspnea and syncope.   Respiratory: Negative.  Negative for shortness of breath.    Hematologic/Lymphatic: Negative.    Musculoskeletal: Negative.    Gastrointestinal: Negative.    Neurological: Negative.  Negative for headaches.          Objective:     /82 (BP Location: Left arm, Patient Position: Sitting, Cuff Size: Adult)  Pulse 77  Resp 18  Ht 180.3 cm (71\")  Wt 108 kg (237 lb 6.4 oz)  SpO2 94%  BMI 33.11 kg/m2  Physical Exam   Constitutional: He appears well-developed and well-nourished. No distress.   HENT:   Head: Normocephalic and atraumatic.   Mouth/Throat: Oropharynx is clear and moist. No oropharyngeal exudate.   Eyes: Conjunctivae and EOM are normal. Pupils are equal, round, and reactive to light. No scleral icterus.   Neck: Normal range of motion. Neck supple. No JVD present. No tracheal deviation present. No thyromegaly present.   Cardiovascular: Normal rate, regular rhythm, normal heart sounds and intact distal pulses.  PMI is not displaced.  Exam reveals no gallop, no friction rub and no decreased pulses.    No murmur heard.  Pulses:       Carotid pulses are 3+ on the right side, and 3+ on the left side.       Radial pulses are 3+ on the right side, and 3+ on the left side.   Pulmonary/Chest: Effort normal and breath " sounds normal. No respiratory distress. He has no wheezes. He has no rales. He exhibits no tenderness.   Abdominal: Soft. Bowel sounds are normal. He exhibits no distension, no abdominal bruit and no mass. There is no splenomegaly or hepatomegaly. There is no tenderness. There is no rebound and no guarding.   Musculoskeletal: Normal range of motion. He exhibits no edema, tenderness or deformity.   Lymphadenopathy:     He has no cervical adenopathy.   Neurological: He is alert. He has normal reflexes. No cranial nerve deficit. He exhibits normal muscle tone. Coordination normal.   Skin: Skin is warm and dry. No rash noted. He is not diaphoretic. No erythema.   Psychiatric: He has a normal mood and affect. His behavior is normal. Judgment and thought content normal.         Lab Review  Lab Results   Component Value Date     03/02/2015    K 4.6 03/02/2015     03/02/2015    BUN 22 (H) 03/02/2015    CREATININE 0.84 03/02/2015    GLUCOSE 126 (H) 03/02/2015    CALCIUM 9.7 03/02/2015    ALT 25 03/02/2015    ALKPHOS 83 03/02/2015    LABIL2 1.3 (L) 03/02/2015     No results found for: CKTOTAL  Lab Results   Component Value Date    WBC 9.3 03/02/2015    HGB 14.4 03/02/2015    HCT 43.9 03/02/2015     03/02/2015     No results found for: INR  Lab Results   Component Value Date    MG 2.2 09/02/2014     Lab Results   Component Value Date    TSH 3.282 03/02/2015     No results found for: BNP  Lab Results   Component Value Date    CHLPL 166 03/02/2015    TRIG 324 (H) 03/02/2015    HDL 38 (L) 03/02/2015    VLDL 65 (H) 03/02/2015           ECG 12 Lead  Date/Time: 1/23/2018 1:16 PM  Performed by: NOLBERTO NICHOLSON  Authorized by: NOLBERTO NICHOLSON   Comparison: compared with previous ECG from 1/17/2017  Similar to previous ECG  Rhythm: sinus rhythm  Rate: normal  Conduction: conduction normal  ST Segments: ST segments normal  T Waves: T waves normal  QRS axis: normal  Other: no other findings  Clinical  impression: normal ECG               I personally viewed and interpreted the patient's LAB data         Assessment:     1. Encounter for monitoring sotalol therapy    2. Class 1 obesity due to excess calories without serious comorbidity with body mass index (BMI) of 33.0 to 33.9 in adult    3. Paroxysmal a-fib February 2006 leg [facility] currently in sinus rhythm.[id number]          Plan:      Patient has paroxysmal atrial fibrillation.  He is on sotalol therapy.  He is maintaining in sinus rhythm.  There are no drug side effects.  QT interval is normal.    Patient also has been on anticoagulation he is taking eliquis 5 mg twice a day because of chads 2 VASC score 3.  There are no drug side effects.    Patient also has hypertension which is very well controlled with lisinopril.  He is following closely with his PCP he has diabetes mellitus and obesity.  Will get a copy of lab work from [facility].    Patient was advised to continue sotalol and eliquis.  Follow-up in 6 months    Thank you for giving me the oppertunity to participate in your patient's cardiac care.  Weight loss was very strongly urged.    Sincerely,    ANTON Olson M.D. FACP FACC        Return in about 6 months (around 7/23/2018).

## 2018-03-14 ENCOUNTER — OFFICE VISIT (OUTPATIENT)
Dept: PSYCHIATRY | Facility: CLINIC | Age: 68
End: 2018-03-14

## 2018-03-14 VITALS
HEART RATE: 72 BPM | WEIGHT: 239 LBS | SYSTOLIC BLOOD PRESSURE: 133 MMHG | DIASTOLIC BLOOD PRESSURE: 77 MMHG | BODY MASS INDEX: 33.46 KG/M2 | HEIGHT: 71 IN

## 2018-03-14 DIAGNOSIS — Z79.899 MEDICATION MANAGEMENT: Primary | ICD-10-CM

## 2018-03-14 DIAGNOSIS — F41.1 GENERALIZED ANXIETY DISORDER: ICD-10-CM

## 2018-03-14 LAB
AMPHETAMINE CUT-OFF: 1000
BENZODIAZIPINE CUT-OFF: 300
BUPRENORPHINE CUT-OFF: 10
COCAINE CUT-OFF: 300
EXTERNAL AMPHETAMINE SCREEN URINE: NEGATIVE
EXTERNAL BENZODIAZEPINE SCREEN URINE: NEGATIVE
EXTERNAL BUPRENORPHINE SCREEN URINE: NEGATIVE
EXTERNAL COCAINE SCREEN URINE: NEGATIVE
EXTERNAL MDMA: NEGATIVE
EXTERNAL METHADONE SCREEN URINE: NEGATIVE
EXTERNAL METHAMPHETAMINE SCREEN URINE: NEGATIVE
EXTERNAL OPIATES SCREEN URINE: POSITIVE
EXTERNAL OXYCODONE SCREEN URINE: NEGATIVE
EXTERNAL THC SCREEN URINE: NEGATIVE
MDMA CUT-OFF: 500
METHADONE CUT-OFF: 300
METHAMPHETAMINE CUT-OFF: 1000
OPIATES CUT-OFF: 300
OXYCODONE CUT-OFF: 100
THC CUT-OFF: 50

## 2018-03-14 PROCEDURE — 99212 OFFICE O/P EST SF 10 MIN: CPT | Performed by: PSYCHIATRY & NEUROLOGY

## 2018-03-14 RX ORDER — IBUPROFEN 800 MG/1
TABLET ORAL
COMMUNITY
Start: 2018-03-07 | End: 2018-07-23 | Stop reason: ALTCHOICE

## 2018-03-14 RX ORDER — SERTRALINE HYDROCHLORIDE 100 MG/1
100 TABLET, FILM COATED ORAL DAILY
Qty: 30 TABLET | Refills: 2 | Status: SHIPPED | OUTPATIENT
Start: 2018-03-14 | End: 2018-06-13 | Stop reason: SDUPTHER

## 2018-03-14 RX ORDER — ACETAMINOPHEN AND CODEINE PHOSPHATE 300; 30 MG/1; MG/1
TABLET ORAL
COMMUNITY
Start: 2017-12-19 | End: 2018-07-23 | Stop reason: ALTCHOICE

## 2018-03-14 NOTE — PROGRESS NOTES
"Subjective   Patient ID: Mkii Jett is a 67 y.o. male is here today for follow-up.     /77   Pulse 72   Ht 180.3 cm (71\")   Wt 108 kg (239 lb)   BMI 33.33 kg/m²     Review of patient's allergies indicates no known allergies.    History of Present Illness The patient returns for a scheduled appointment. He states that he fell recently at home and hurt his shoulder, but otherwise his mood has been good and denies any depression, hopelessness or suicidal ideations. Reports compliance with his medication and no side effects.  The following portions of the patient's history were reviewed and updated as appropriate: allergies, current medications and problem list.    Review of Systems   Constitutional: Negative.    HENT: Negative.    Eyes: Negative.    Respiratory: Negative.    Cardiovascular: Negative.    Gastrointestinal: Negative.        Objective   Mental Status Exam  Appearance:  clean and casually dressed, appropriate  Attitude toward clinician:  cooperative and agreeable   Speech:    Rate:  regular rate and rhythm   Volume:  normal  Motor:  no abnormal movements present  Mood:  Good  Affect:  euthymic  Thought Processes:  linear, logical, and goal directed  Thought Content:  normal  Suicidal Thoughts:  absent  Homicidal Thoughts:  absent  Perceptual Disturbance: no perceptual disturbance  Attention and Concentration:  good  Insight and Judgement:  good  Memory:  memory appears to be intact    MEDICATION ISSUES:  Current Outpatient Prescriptions on File Prior to Visit   Medication Sig Dispense Refill   • apixaban (ELIQUIS) 5 MG tablet tablet Take 1 tablet by mouth 2 (Two) Times a Day. 60 tablet 2   • Blood Glucose Monitoring Suppl (TRUE METRIX METER) w/Device kit      • lisinopril (PRINIVIL,ZESTRIL) 10 MG tablet Take 10 mg by mouth daily.     • LORazepam (ATIVAN) 1 MG tablet      • metFORMIN ER (GLUCOPHAGE-XR) 500 MG 24 hr tablet      • sotalol (BETAPACE) 80 MG tablet Take 80 mg by mouth 2 (two) " times a day.     • TRUE METRIX BLOOD GLUCOSE TEST test strip      • TRUEPLUS LANCETS 30G misc      • [DISCONTINUED] sertraline (ZOLOFT) 100 MG tablet Take 1 tablet by mouth Daily. 30 tablet 2   • metFORMIN (GLUCOPHAGE) 500 MG tablet Take 500 mg by mouth Daily With Breakfast.       No current facility-administered medications on file prior to visit.          Lab Review:   No visits with results within 2 Month(s) from this visit.   Latest known visit with results is:   Admission on 03/02/2015, Discharged on 03/02/2015   Component Date Value   • Sodium 03/02/2015 140    • Potassium 03/02/2015 4.6    • Chloride 03/02/2015 105    • CO2 03/02/2015 25.2    • Glucose 03/02/2015 126*   • BUN 03/02/2015 22*   • Creatinine 03/02/2015 0.84    • Calcium 03/02/2015 9.7    • AST (SGOT) 03/02/2015 21    • Alkaline Phosphatase 03/02/2015 83    • Total Bilirubin 03/02/2015 0.3    • ALT (SGPT) 03/02/2015 25    • A/G Ratio 03/02/2015 1.3*   • Osmolality 03/02/2015 284    • BUN/Creatinine Ratio 03/02/2015 26.5    • Anion Gap 03/02/2015 9.8    • Albumin 03/02/2015 4.2    • Total Protein 03/02/2015 7.4    • eGFR 03/02/2015 97    • Fasting? 03/02/2015 YES    • Total Cholesterol 03/02/2015 166    • Triglycerides 03/02/2015 324*   • HDL Cholesterol 03/02/2015 38*   • VLDL Cholesterol 03/02/2015 65*   • LDL Cholesterol  03/02/2015 63    • TSH 03/02/2015 3.282    • WBC 03/02/2015 9.3    • RBC 03/02/2015 4.87    • Hemoglobin 03/02/2015 14.4    • Hematocrit 03/02/2015 43.9    • MCV 03/02/2015 90.1    • MCH 03/02/2015 29.6    • MCHC 03/02/2015 32.8*   • Platelets 03/02/2015 223    • RDW 03/02/2015 14.7*   • MPV 03/02/2015 10.6*   • Neutrophil Rel % 03/02/2015 69.0    • Lymphocyte Rel % 03/02/2015 16.5    • Monocyte Rel % 03/02/2015 12.2*   • Eosinophil Rel % 03/02/2015 1.7    • Basophil Rel % 03/02/2015 0.3    • Immature Granulocyte Rel* 03/02/2015 0.30    • Neutrophils Absolute 03/02/2015 6.4    • Lymphocytes Absolute 03/02/2015 1.5    •  Monocytes Absolute 03/02/2015 1.1*   • Eosinophils Absolute 03/02/2015 0.2    • Basophils Absolute 03/02/2015 0.0    • Abs Imm Gran 03/02/2015 0.030      Assessment/Plan   Diagnoses and all orders for this visit:    Medication management  -     [facility] Screen    Generalized anxiety disorder    Other orders  -     sertraline (ZOLOFT) 100 MG tablet; Take 1 tablet by mouth Daily.      Return in about 3 months (around 6/14/2018).

## 2018-06-13 ENCOUNTER — OFFICE VISIT (OUTPATIENT)
Dept: PSYCHIATRY | Facility: CLINIC | Age: 68
End: 2018-06-13

## 2018-06-13 VITALS
BODY MASS INDEX: 33.46 KG/M2 | SYSTOLIC BLOOD PRESSURE: 119 MMHG | WEIGHT: 239 LBS | HEIGHT: 71 IN | DIASTOLIC BLOOD PRESSURE: 72 MMHG | HEART RATE: 77 BPM

## 2018-06-13 DIAGNOSIS — F41.1 GENERALIZED ANXIETY DISORDER: Primary | ICD-10-CM

## 2018-06-13 DIAGNOSIS — Z79.899 MEDICATION MANAGEMENT: ICD-10-CM

## 2018-06-13 PROCEDURE — 99213 OFFICE O/P EST LOW 20 MIN: CPT | Performed by: PSYCHIATRY & NEUROLOGY

## 2018-06-13 RX ORDER — SERTRALINE HYDROCHLORIDE 100 MG/1
100 TABLET, FILM COATED ORAL DAILY
Qty: 30 TABLET | Refills: 2 | Status: SHIPPED | OUTPATIENT
Start: 2018-06-13 | End: 2018-09-19 | Stop reason: SDUPTHER

## 2018-06-13 NOTE — PROGRESS NOTES
"Subjective   Patient ID: Miki Jett is a 67 y.o. male is here today for follow-up.     /72   Pulse 77   Ht 180.3 cm (71\")   Wt 108 kg (239 lb)   BMI 33.33 kg/m²     Patient has no known allergies.    History of Present Illness The patient returns for a scheduled appointment. He states that he is feeling good and his mood has been stable. Reports compliance with his medication and no side effects.  The following portions of the patient's history were reviewed and updated as appropriate: allergies, current medications and problem list.    Review of Systems   Constitutional: Negative.    HENT: Negative.    Eyes: Negative.    Respiratory: Negative.    Cardiovascular: Negative.    Gastrointestinal: Negative.        Objective   Mental Status Exam  Appearance:  clean and casually dressed, appropriate  Attitude toward clinician:  cooperative and agreeable   Speech:    Rate:  regular rate and rhythm   Volume:  normal  Motor:  no abnormal movements present  Mood:  Good  Affect:  euthymic  Thought Processes:  linear, logical, and goal directed  Thought Content:  normal  Suicidal Thoughts:  absent  Homicidal Thoughts:  absent  Perceptual Disturbance: no perceptual disturbance  Attention and Concentration:  good  Insight and Judgement:  good  Memory:  memory appears to be intact    MEDICATION ISSUES:  Current Outpatient Prescriptions on File Prior to Visit   Medication Sig Dispense Refill   • acetaminophen-codeine (TYLENOL #3) 300-30 MG per tablet      • apixaban (ELIQUIS) 5 MG tablet tablet Take 1 tablet by mouth 2 (Two) Times a Day. 60 tablet 2   • Blood Glucose Monitoring Suppl (TRUE METRIX METER) w/Device kit      • ibuprofen (ADVIL,MOTRIN) 800 MG tablet      • lisinopril (PRINIVIL,ZESTRIL) 10 MG tablet Take 10 mg by mouth daily.     • LORazepam (ATIVAN) 1 MG tablet      • metFORMIN (GLUCOPHAGE) 500 MG tablet Take 500 mg by mouth Daily With Breakfast.     • metFORMIN ER (GLUCOPHAGE-XR) 500 MG 24 hr tablet    "   • sotalol (BETAPACE) 80 MG tablet Take 80 mg by mouth 2 (two) times a day.     • TRUE METRIX BLOOD GLUCOSE TEST test strip      • TRUEPLUS LANCETS 30G misc      • [DISCONTINUED] sertraline (ZOLOFT) 100 MG tablet Take 1 tablet by mouth Daily. 30 tablet 2     No current facility-administered medications on file prior to visit.          Lab Review:   No visits with results within 2 Month(s) from this visit.   Latest known visit with results is:   Office Visit on 03/14/2018   Component Date Value   • External Amphetamine Scr* 03/14/2018 Negative    • Amphetamine Cut-Off 03/14/2018 1000    • External Benzodiazepine * 03/14/2018 Negative    • Benzodiazipine Cut-Off 03/14/2018 300    • External Cocaine Screen * 03/14/2018 Negative    • Cocaine Cut-Off 03/14/2018 300    • External THC Screen Urine 03/14/2018 Negative    • THC Cut-Off 03/14/2018 50    • External Methadone Scree* 03/14/2018 Negative    • Methadone Cut-Off 03/14/2018 300    • External Methamphetamine* 03/14/2018 Negative    • Methamphetamine Cut-Off 03/14/2018 1000    • External Oxycodone Scree* 03/14/2018 Negative    • Oxycodone Cut-Off 03/14/2018 100    • External Buprenorphine S* 03/14/2018 Negative    • Buprenorphine Cut-Off 03/14/2018 10    • External MDMA 03/14/2018 Negative    • MDMA Cut-Off 03/14/2018 500    • External Opiates Screen * 03/14/2018 Positive    • Opiates Cut-Off 03/14/2018 300      Assessment/Plan   Diagnoses and all orders for this visit:    Generalized anxiety disorder    Medication management    Other orders  -     sertraline (ZOLOFT) 100 MG tablet; Take 1 tablet by mouth Daily.      Return in about 3 months (around 9/13/2018).

## 2018-07-11 ENCOUNTER — OFFICE VISIT (OUTPATIENT)
Dept: UROLOGY | Facility: CLINIC | Age: 68
End: 2018-07-11

## 2018-07-11 VITALS
WEIGHT: 237 LBS | HEIGHT: 71 IN | SYSTOLIC BLOOD PRESSURE: 143 MMHG | DIASTOLIC BLOOD PRESSURE: 86 MMHG | BODY MASS INDEX: 33.18 KG/M2 | HEART RATE: 71 BPM

## 2018-07-11 DIAGNOSIS — R97.20 ELEVATED PSA: Primary | ICD-10-CM

## 2018-07-11 PROCEDURE — 99203 OFFICE O/P NEW LOW 30 MIN: CPT | Performed by: UROLOGY

## 2018-07-11 NOTE — PROGRESS NOTES
Chief Complaint:          Elevated psa of 5.4     HPI:   67 y.o. male.  Pt's psa is usually in the 3's until this year when it went up to 5.4.  Pt denies voiding symptoms.  Fhx of prostate cancer in brother and nephew.  HPI      Past Medical History:        Past Medical History:   Diagnosis Date   • Anxiety    • Elevated PSA    • Encounter for monitoring sotalol therapy    • Hyperlipidemia    • Hypertension    • Obesity    • Paroxysmal a-fib (CMS/HCC)          Current Meds:     Current Outpatient Prescriptions   Medication Sig Dispense Refill   • acetaminophen-codeine (TYLENOL #3) 300-30 MG per tablet      • apixaban (ELIQUIS) 5 MG tablet tablet Take 1 tablet by mouth 2 (Two) Times a Day. 60 tablet 2   • Blood Glucose Monitoring Suppl (TRUE METRIX METER) w/Device kit      • ibuprofen (ADVIL,MOTRIN) 800 MG tablet      • lisinopril (PRINIVIL,ZESTRIL) 10 MG tablet Take 10 mg by mouth daily.     • LORazepam (ATIVAN) 1 MG tablet      • metFORMIN (GLUCOPHAGE) 500 MG tablet Take 500 mg by mouth Daily With Breakfast.     • metFORMIN ER (GLUCOPHAGE-XR) 500 MG 24 hr tablet      • sertraline (ZOLOFT) 100 MG tablet Take 1 tablet by mouth Daily. 30 tablet 2   • sotalol (BETAPACE) 80 MG tablet Take 80 mg by mouth 2 (two) times a day.     • TRUE METRIX BLOOD GLUCOSE TEST test strip      • TRUEPLUS LANCETS 30G misc        No current facility-administered medications for this visit.         Allergies:      No Known Allergies     Past Surgical History:     Past Surgical History:   Procedure Laterality Date   • CARDIOVASCULAR STRESS TEST  2012   • CATARACT EXTRACTION     • ECHO - CONVERTED  2012         Social History:     Social History     Social History   • Marital status:      Spouse name: N/A   • Number of children: N/A   • Years of education: N/A     Occupational History   • Not on file.     Social History Main Topics   • Smoking status: Former Smoker     Packs/day: 0.50     Years: 3.00     Types: Cigarettes     Quit  "date: 2006   • Smokeless tobacco: Former User     Types: Chew   • Alcohol use No   • Drug use: No   • Sexual activity: Defer     Other Topics Concern   • Not on file     Social History Narrative   • No narrative on file       Family History:     Family History   Problem Relation Age of Onset   • No Known Problems Mother    • Lung cancer Father    • Prostate cancer Brother    • Heart disease Sister        Review of Systems:     Review of Systems   Constitutional: Negative for fatigue.   HENT: Negative for sinus pain.    Eyes: Negative for pain.   Respiratory: Negative for chest tightness.    Cardiovascular: Negative for chest pain.   Gastrointestinal: Negative for abdominal pain and constipation.   Endocrine: Negative for heat intolerance.   Genitourinary: Negative for frequency.   Musculoskeletal: Negative for back pain.   Skin: Negative for rash.   Allergic/Immunologic: Negative for food allergies.   Neurological: Negative for headaches.   Hematological: Bruises/bleeds easily.   Psychiatric/Behavioral: The patient is not nervous/anxious.            I have reviewed the follow portions of the patient's history and confirmed they are accurate today:  allergies, current medications, past family history, past medical history, past social history, past surgical history, problem list and ROS  Physical Exam:     Physical Exam    /86 (BP Location: Right arm, Patient Position: Sitting, Cuff Size: Adult)   Pulse 71   Ht 180.3 cm (71\")   Wt 108 kg (237 lb)   BMI 33.05 kg/m²    Procedure:         Assessment:     Encounter Diagnosis   Name Primary?   • Elevated PSA Yes     No orders of the defined types were placed in this encounter.      Plan:   Will check a free and total psa and if free is less than 10% with his family history I would biopsy with ultrasound guidance.    Patient's Body mass index is 33.05 kg/m². BMI is above normal parameters. Recommendations include: educational material.      Counseling was given " to patient for the following topics diagnostic results including: psa. The interim medical history and current results were reviewed.  A treatment plan with follow-up was made for Elevated PSA [R97.20].     This document has been electronically signed by Kevin Wilburn MD July 11, 2018 9:17 AM

## 2018-07-23 ENCOUNTER — OFFICE VISIT (OUTPATIENT)
Dept: CARDIOLOGY | Facility: CLINIC | Age: 68
End: 2018-07-23

## 2018-07-23 VITALS
BODY MASS INDEX: 33.6 KG/M2 | WEIGHT: 240 LBS | HEIGHT: 71 IN | DIASTOLIC BLOOD PRESSURE: 80 MMHG | OXYGEN SATURATION: 94 % | SYSTOLIC BLOOD PRESSURE: 136 MMHG | HEART RATE: 69 BPM

## 2018-07-23 DIAGNOSIS — Z79.899 ENCOUNTER FOR MONITORING SOTALOL THERAPY: ICD-10-CM

## 2018-07-23 DIAGNOSIS — Z51.81 ENCOUNTER FOR MONITORING SOTALOL THERAPY: ICD-10-CM

## 2018-07-23 DIAGNOSIS — I10 ESSENTIAL HYPERTENSION: ICD-10-CM

## 2018-07-23 DIAGNOSIS — I48.0 PAROXYSMAL A-FIB (HCC): Primary | ICD-10-CM

## 2018-07-23 PROCEDURE — 99213 OFFICE O/P EST LOW 20 MIN: CPT | Performed by: INTERNAL MEDICINE

## 2018-07-23 PROCEDURE — 93000 ELECTROCARDIOGRAM COMPLETE: CPT | Performed by: INTERNAL MEDICINE

## 2018-07-23 NOTE — PROGRESS NOTES
subjective     Chief Complaint   Patient presents with   • Atrial Fibrillation   • Hyperlipidemia   • Hypertension   • Follow-up     History of Present Illness    Patient is 67 years old white male who has history of paroxysmal atrial fibrillation.  Patient is taking Betapace 80 mg twice a day and is maintaining in sinus rhythm.  There are no drug side effects.  There is no QT prolongation no proarrhythmia effects.  Patient denies any palpitations syncope or near syncope.    He is also anticoagulated with eliquis 5 twice a day.  He is compliant with medications and taking his regularly.  No excessive bruising or bleeding noted.    He also has hypertension .  Patient is taking lisinopril for hypertension.    Past Surgical History:   Procedure Laterality Date   • CARDIOVASCULAR STRESS TEST  2012   • CATARACT EXTRACTION     • ECHO - CONVERTED  2012     Family History   Problem Relation Age of Onset   • No Known Problems Mother    • Lung cancer Father    • Prostate cancer Brother    • Heart disease Sister      Past Medical History:   Diagnosis Date   • Anxiety    • Elevated PSA    • Encounter for monitoring sotalol therapy    • Hyperlipidemia    • Hypertension    • Obesity    • Paroxysmal a-fib (CMS/[facility])      Patient Active Problem List   Diagnosis   • Paroxysmal a-fib February 2006 [facility] currently in sinus rhythm.[id number] 3   • Encounter for monitoring sotalol therapy   • Hypertension   • Hyperlipidemia   • Obesity       Social History   Substance Use Topics   • Smoking status: Former Smoker     Packs/day: 0.50     Years: 3.00     Types: Cigarettes     Quit date: 2006   • Smokeless tobacco: Former User     Types: Chew   • Alcohol use No       No Known Allergies    Current Outpatient Prescriptions on File Prior to Visit   Medication Sig   • apixaban (ELIQUIS) 5 MG tablet tablet Take 1 tablet by mouth 2 (Two) Times a Day.   • Blood Glucose Monitoring Suppl (TRUE METRIX METER) w/Device kit   "  • lisinopril (PRINIVIL,ZESTRIL) 10 MG tablet Take 10 mg by mouth daily.   • LORazepam (ATIVAN) 1 MG tablet    • metFORMIN (GLUCOPHAGE) 500 MG tablet Take 500 mg by mouth Daily With Breakfast.   • metFORMIN ER (GLUCOPHAGE-XR) 500 MG 24 hr tablet    • sertraline (ZOLOFT) 100 MG tablet Take 1 tablet by mouth Daily.   • sotalol (BETAPACE) 80 MG tablet Take 80 mg by mouth 2 (two) times a day.   • TRUE METRIX BLOOD GLUCOSE TEST test strip    • TRUEPLUS LANCETS 30G misc    • [DISCONTINUED] acetaminophen-codeine (TYLENOL #3) 300-30 MG per tablet    • [DISCONTINUED] ibuprofen (ADVIL,MOTRIN) 800 MG tablet      No current facility-administered medications on file prior to visit.          The following portions of the patient's history were reviewed and updated as appropriate: allergies, current medications, past family history, past medical history, past social history, past surgical history and problem list.    Review of Systems   Constitution: Negative.   HENT: Negative.  Negative for congestion.    Eyes: Negative.    Cardiovascular: Negative.  Negative for chest pain, cyanosis, dyspnea on exertion, irregular heartbeat, leg swelling, near-syncope, orthopnea, palpitations, paroxysmal nocturnal dyspnea and syncope.   Respiratory: Negative.  Negative for shortness of breath.    Hematologic/Lymphatic: Negative.    Musculoskeletal: Negative.    Gastrointestinal: Negative.    Neurological: Negative.  Negative for headaches.          Objective:     /80 (BP Location: Left arm, Patient Position: Sitting)   Pulse 69   Ht 180.3 cm (71\")   Wt 109 kg (240 lb)   SpO2 94%   BMI 33.47 kg/m²   Physical Exam   Constitutional: He appears well-developed and well-nourished. No distress.   HENT:   Head: Normocephalic and atraumatic.   Mouth/Throat: Oropharynx is clear and moist. No oropharyngeal exudate.   Eyes: Pupils are equal, round, and reactive to light. Conjunctivae and EOM are normal. No scleral icterus.   Neck: Normal range " of motion. Neck supple. No JVD present. No tracheal deviation present. No thyromegaly present.   Cardiovascular: Normal rate, regular rhythm, normal heart sounds and intact distal pulses.  PMI is not displaced.  Exam reveals no gallop, no friction rub and no decreased pulses.    No murmur heard.  Pulses:       Carotid pulses are 3+ on the right side, and 3+ on the left side.       Radial pulses are 3+ on the right side, and 3+ on the left side.   Pulmonary/Chest: Effort normal and breath sounds normal. No respiratory distress. He has no wheezes. He has no rales. He exhibits no tenderness.   Abdominal: Soft. Bowel sounds are normal. He exhibits no distension, no abdominal bruit and no mass. There is no splenomegaly or hepatomegaly. There is no tenderness. There is no rebound and no guarding.   Musculoskeletal: Normal range of motion. He exhibits no edema, tenderness or deformity.   Lymphadenopathy:     He has no cervical adenopathy.   Neurological: He is alert. He has normal reflexes. No cranial nerve deficit. He exhibits normal muscle tone. Coordination normal.   Skin: Skin is warm and dry. No rash noted. He is not diaphoretic. No erythema.   Psychiatric: He has a normal mood and affect. His behavior is normal. Judgment and thought content normal.         Lab Review      ECG 12 Lead  Date/Time: 7/23/2018 12:20 PM  Performed by: NOLBERTO NICHOLSON  Authorized by: NOLBERTO NICHOLSON   Comparison: compared with previous ECG from 1/23/2018  Similar to previous ECG  Rhythm: sinus rhythm  Rate: normal  BPM: 70  Conduction: conduction normal  ST Segments: ST segments normal  T Waves: T waves normal  QRS axis: normal  Other: no other findings  Clinical impression: normal ECG               I personally viewed and interpreted the patient's LAB data         Assessment:     1. Paroxysmal a-fib February 2006 leg [facility] currently in sinus rhythm.[id number] 3    2. Essential hypertension    3.  Encounter for monitoring sotalol therapy          Plan:      Patient is 67 years old white male with history of paroxysmal atrial fibrillation currently maintaining in sinus rhythm on sotalol therapy.  No drug side effects noted  EKG does not show any QT prolongation.  Patient was advised to continue taking sotalol 80 twice a day.    Anticoagulation with eliquis and 5 mg twice a day was continued.  Blood pressure is also very well controlled.    Patient is diabetic under care of for his PCP he is taking Glucophage and blood sugar has apparently been well controlled.  Statin therapy is also indicated because of diabetes.  Follow-up in 6 months        Return in about 6 months (around 1/23/2019).

## 2018-09-19 ENCOUNTER — OFFICE VISIT (OUTPATIENT)
Dept: PSYCHIATRY | Facility: CLINIC | Age: 68
End: 2018-09-19

## 2018-09-19 VITALS
BODY MASS INDEX: 33.88 KG/M2 | SYSTOLIC BLOOD PRESSURE: 136 MMHG | HEIGHT: 71 IN | WEIGHT: 242 LBS | DIASTOLIC BLOOD PRESSURE: 79 MMHG | HEART RATE: 78 BPM

## 2018-09-19 DIAGNOSIS — F41.1 GENERALIZED ANXIETY DISORDER: Primary | ICD-10-CM

## 2018-09-19 DIAGNOSIS — Z79.899 MEDICATION MANAGEMENT: ICD-10-CM

## 2018-09-19 PROCEDURE — 99214 OFFICE O/P EST MOD 30 MIN: CPT | Performed by: NURSE PRACTITIONER

## 2018-09-19 RX ORDER — SERTRALINE HYDROCHLORIDE 100 MG/1
100 TABLET, FILM COATED ORAL DAILY
Qty: 90 TABLET | Refills: 1 | Status: SHIPPED | OUTPATIENT
Start: 2018-09-19 | End: 2019-03-20 | Stop reason: SDUPTHER

## 2018-09-19 NOTE — PROGRESS NOTES
"Subjective   Patient ID: Miki Jett is a 68 y.o. male is here today for follow-up.  This is first meeting of undersigned and pt as care was transferred from Dr. De La Torre.    /79   Pulse 78   Ht 180.3 cm (70.98\")   Wt 110 kg (242 lb)   BMI 33.77 kg/m²     Patient has no known allergies.  CC: Anxiety  History of Present Illness   Patient reports that he has been doing well.  He denies any anxiety or depressive symptoms currently.  No suicidal or homicidal ideations.  No auditory or visual hallucinations.  He reports staying active for the most part.  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  He shares that if he has down time it only gives his mind time to wander which increases his anxiety.  Patient is compliant with medication, denies any side effects.  He is overall tolerating and mood is stable.  He reports sleeping well and appetite is good.  Denies any medical concerns currently for stressors.      The following portions of the patient's history were reviewed and updated as appropriate: allergies, current medications and problem list.    Review of Systems   Constitutional: Negative.  Negative for activity change, appetite change and fatigue.   HENT: Negative.    Eyes: Negative.  Negative for visual disturbance.   Respiratory: Negative.    Cardiovascular: Negative.    Gastrointestinal: Negative.  Negative for nausea.   Endocrine: Negative.    Genitourinary: Negative.    Musculoskeletal: Negative for arthralgias.   Skin: Negative.    Allergic/Immunologic: Negative.    Neurological: Negative for dizziness, seizures and headaches.   Hematological: Negative.    Psychiatric/Behavioral: Negative for agitation, behavioral problems, confusion, decreased concentration, dysphoric mood, hallucinations, self-injury, sleep disturbance and suicidal ideas. The patient is not nervous/anxious and is not hyperactive.        Objective   Mental Status Exam  Appearance:  clean and casually dressed, " appropriate  Attitude toward clinician:  cooperative and agreeable   Speech:    Rate:  regular rate and rhythm   Volume:  normal  Motor:  no abnormal movements present  Mood:  Good  Affect:  euthymic  Thought Processes:  linear, logical, and goal directed  Thought Content:  normal  Suicidal Thoughts:  absent  Homicidal Thoughts:  absent  Perceptual Disturbance: no perceptual disturbance  Attention and Concentration:  good  Insight and Judgement:  good  Memory:  memory appears to be intact    MEDICATION ISSUES:  Current Outpatient Prescriptions on File Prior to Visit   Medication Sig Dispense Refill   • apixaban (ELIQUIS) 5 MG tablet tablet Take 1 tablet by mouth 2 (Two) Times a Day. 60 tablet 2   • Blood Glucose Monitoring Suppl (TRUE METRIX METER) w/Device kit      • lisinopril (PRINIVIL,ZESTRIL) 10 MG tablet Take 10 mg by mouth daily.     • LORazepam (ATIVAN) 1 MG tablet      • metFORMIN (GLUCOPHAGE) 500 MG tablet Take 500 mg by mouth Daily With Breakfast.     • metFORMIN ER (GLUCOPHAGE-XR) 500 MG 24 hr tablet      • sotalol (BETAPACE) 80 MG tablet Take 80 mg by mouth 2 (two) times a day.     • TRUE METRIX BLOOD GLUCOSE TEST test strip      • TRUEPLUS LANCETS 30G misc      • [DISCONTINUED] sertraline (ZOLOFT) 100 MG tablet Take 1 tablet by mouth Daily. 30 tablet 2     No current facility-administered medications on file prior to visit.          Lab Review:   No visits with results within 2 Month(s) from this visit.   Latest known visit with results is:   Office Visit on 03/14/2018   Component Date Value   • External Amphetamine Scr* 03/14/2018 Negative    • Amphetamine Cut-Off 03/14/2018 1000    • External Benzodiazepine * 03/14/2018 Negative    • Benzodiazipine Cut-Off 03/14/2018 300    • External Cocaine Screen * 03/14/2018 Negative    • Cocaine Cut-Off 03/14/2018 300    • External THC Screen Urine 03/14/2018 Negative    • THC Cut-Off 03/14/2018 50    • External Methadone Scree* 03/14/2018 Negative    •  Methadone Cut-Off 03/14/2018 300    • External Methamphetamine* 03/14/2018 Negative    • Methamphetamine Cut-Off 03/14/2018 1000    • External Oxycodone Scree* 03/14/2018 Negative    • Oxycodone Cut-Off 03/14/2018 100    • External Buprenorphine S* 03/14/2018 Negative    • Buprenorphine Cut-Off 03/14/2018 10    • External MDMA 03/14/2018 Negative    • MDMA Cut-Off 03/14/2018 500    • External Opiates Screen * 03/14/2018 Positive    • Opiates Cut-Off 03/14/2018 300      Assessment/Plan   Diagnoses and all orders for this visit:    Generalized anxiety disorder  -     sertraline (ZOLOFT) 100 MG tablet; Take 1 tablet by mouth Daily.    Medication management    In 9:15am  Out 9:40am   Impression: mood stable, asymptomatic  Prognosis: good, symptoms resolution on current meds  Body mass index is 33.77 kg/m².  Patient was educated on healthier and more balanced diet choices and encouraged exercise within physical limitations.  We will continue Zoloft for anxiety. RBSE of meds discussed and pt agrees to tx plan.   Return in about 6 months (around 3/19/2019), or if symptoms worsen or fail to improve, for Recheck, Next scheduled follow up.

## 2018-10-10 ENCOUNTER — OFFICE VISIT (OUTPATIENT)
Dept: UROLOGY | Facility: CLINIC | Age: 68
End: 2018-10-10

## 2018-10-10 ENCOUNTER — TELEPHONE (OUTPATIENT)
Dept: CARDIOLOGY | Facility: CLINIC | Age: 68
End: 2018-10-10

## 2018-10-10 VITALS — WEIGHT: 240 LBS | HEIGHT: 71 IN | BODY MASS INDEX: 33.6 KG/M2

## 2018-10-10 DIAGNOSIS — R97.20 ELEVATED PSA: Primary | ICD-10-CM

## 2018-10-10 PROCEDURE — 99214 OFFICE O/P EST MOD 30 MIN: CPT | Performed by: UROLOGY

## 2018-10-10 RX ORDER — LEVOFLOXACIN 500 MG/1
TABLET, FILM COATED ORAL
Qty: 3 TABLET | Refills: 0 | Status: SHIPPED | OUTPATIENT
Start: 2018-10-10 | End: 2019-09-26

## 2018-10-10 RX ORDER — ALPRAZOLAM 0.5 MG/1
TABLET ORAL
Qty: 3 TABLET | Refills: 0 | Status: SHIPPED | OUTPATIENT
Start: 2018-10-10 | End: 2019-03-20

## 2018-10-10 NOTE — PROGRESS NOTES
Chief Complaint:          Elevated psa    HPI:   68 y.o. male.  Pt's psa is 4.9 and his free psa is only 11%.  Family history of prostate cancer in his brother and his son.  HPI      Past Medical History:        Past Medical History:   Diagnosis Date   • Anxiety    • Elevated PSA    • Encounter for monitoring sotalol therapy    • Hyperlipidemia    • Hypertension    • Obesity    • Paroxysmal A-fib (CMS/HCC)          Current Meds:     Current Outpatient Prescriptions   Medication Sig Dispense Refill   • apixaban (ELIQUIS) 5 MG tablet tablet Take 1 tablet by mouth 2 (Two) Times a Day. 60 tablet 2   • Blood Glucose Monitoring Suppl (TRUE METRIX METER) w/Device kit      • lisinopril (PRINIVIL,ZESTRIL) 10 MG tablet Take 10 mg by mouth daily.     • LORazepam (ATIVAN) 1 MG tablet      • metFORMIN (GLUCOPHAGE) 500 MG tablet Take 500 mg by mouth Daily With Breakfast.     • metFORMIN ER (GLUCOPHAGE-XR) 500 MG 24 hr tablet      • sertraline (ZOLOFT) 100 MG tablet Take 1 tablet by mouth Daily. 90 tablet 1   • sotalol (BETAPACE) 80 MG tablet Take 80 mg by mouth 2 (two) times a day.     • TRUE METRIX BLOOD GLUCOSE TEST test strip      • TRUEPLUS LANCETS 30G misc        No current facility-administered medications for this visit.         Allergies:      No Known Allergies     Past Surgical History:     Past Surgical History:   Procedure Laterality Date   • CARDIOVASCULAR STRESS TEST  2012   • CATARACT EXTRACTION     • ECHO - CONVERTED  2012         Social History:     Social History     Social History   • Marital status:      Spouse name: N/A   • Number of children: N/A   • Years of education: N/A     Occupational History   • Not on file.     Social History Main Topics   • Smoking status: Former Smoker     Packs/day: 0.50     Years: 3.00     Types: Cigarettes     Quit date: 2006   • Smokeless tobacco: Former User     Types: Chew   • Alcohol use No   • Drug use: No   • Sexual activity: Defer     Other Topics Concern   • Not  on file     Social History Narrative   • No narrative on file       Family History:     Family History   Problem Relation Age of Onset   • No Known Problems Mother    • Lung cancer Father    • Prostate cancer Brother    • Heart disease Sister        Review of Systems:     Review of Systems   Constitutional: Negative for chills, fatigue and fever.   Respiratory: Negative for cough, shortness of breath and wheezing.    Cardiovascular: Negative for leg swelling.   Gastrointestinal: Negative for abdominal pain, nausea and vomiting.   Musculoskeletal: Negative for back pain and joint swelling.   Neurological: Negative for dizziness and headaches.   Psychiatric/Behavioral: Negative for confusion.       IPSS Questionnaire (AUA-7):  Over the past month…    1)  Incomplete Emptying  How often have you had a sensation of not emptying your bladder?  5 - Almost always   2)  Frequency  How often have you had to urinate less than every two hours? 0 - Not at all   3)  Intermittency  How often have you found you stopped and started again several times when you urinated?  0 - Not at all   4) Urgency  How often have you found it difficult to postpone urination?  5 - Almost always   5) Weak Stream  How often have you had a weak urinary stream?  0 - Not at all   6) Straining  How often have you had to push or strain to begin urination?  0 - Not at all   7) Nocturia  How many times did you typically get up at night to urinate?  1 - 1 time   Total Score:  11       Quality of life due to urinary symptoms:  If you were to spend the rest of your life with your urinary condition the way it is now, how would you feel about that? 2-Mostly Satisfied   Urine Leakage (Incontinence) 0-No Leakage       I have reviewed the follow portions of the patient's history and confirmed they are accurate today:  allergies, current medications, past family history, past medical history, past social history, past surgical history, problem list and ROS  Physical  "Exam:     Physical Exam   Constitutional: He is oriented to person, place, and time.   HENT:   Head: Normocephalic and atraumatic.   Right Ear: External ear normal.   Left Ear: External ear normal.   Nose: Nose normal.   Mouth/Throat: Oropharynx is clear and moist.   Eyes: Pupils are equal, round, and reactive to light. Conjunctivae and EOM are normal.   Neck: Normal range of motion. Neck supple. No thyromegaly present.   Cardiovascular: Normal rate, regular rhythm, normal heart sounds and intact distal pulses.    No murmur heard.  Pulmonary/Chest: Effort normal and breath sounds normal. No respiratory distress. He has no wheezes. He has no rales. He exhibits no tenderness.   Abdominal: Soft. Bowel sounds are normal. He exhibits no distension and no mass. There is no tenderness. No hernia.   Genitourinary: Rectum normal, prostate normal and penis normal.   Musculoskeletal: Normal range of motion. He exhibits no edema or tenderness.   Lymphadenopathy:     He has no cervical adenopathy.   Neurological: He is alert and oriented to person, place, and time. No cranial nerve deficit. He exhibits normal muscle tone. Coordination normal.   Skin: Skin is warm. No rash noted.   Psychiatric: He has a normal mood and affect. His behavior is normal. Judgment and thought content normal.   Nursing note and vitals reviewed.      Ht 180.3 cm (70.98\")   Wt 109 kg (240 lb)   BMI 33.49 kg/m²    Procedure:         Assessment:     Encounter Diagnosis   Name Primary?   • Elevated PSA Yes     No orders of the defined types were placed in this encounter.      Plan:   I would recommend a prostate ultrasound and biopsies.  Pt is on elequis and his cardiologist is Dr. Borrego    Patient's Body mass index is 33.49 kg/m². BMI is above normal parameters. Recommendations include: educational material.      Counseling was given to patient for the following topics diagnostic results including: total and free psa. The interim medical history and " current results were reviewed.  A treatment plan with follow-up was made for Elevated PSA [R97.20].  This document has been electronically signed by Kevin Wilburn MD October 10, 2018 10:03 AM

## 2018-10-10 NOTE — TELEPHONE ENCOUNTER
There is no need to stop eliquis for 10 days.    He can stop for 3-4 days .  However there is still a risk of thromboembolic phenomena.

## 2018-10-10 NOTE — TELEPHONE ENCOUNTER
Patient came by the office today he is going to have a colon biopsy and Dr. Stanley is wanting something in writing that he can stop his Eliquis for 10 days.

## 2018-10-12 NOTE — TELEPHONE ENCOUNTER
Contacted the patient made is aware. Hes going to call back after he speaks to the provider about holding the medication.

## 2018-10-31 ENCOUNTER — PROCEDURE VISIT (OUTPATIENT)
Dept: UROLOGY | Facility: CLINIC | Age: 68
End: 2018-10-31

## 2018-10-31 VITALS — WEIGHT: 240 LBS | BODY MASS INDEX: 33.6 KG/M2 | HEIGHT: 71 IN

## 2018-10-31 DIAGNOSIS — Z48.816 SURGICAL AFTERCARE, GENITOURINARY SYSTEM: ICD-10-CM

## 2018-10-31 DIAGNOSIS — C61 PROSTATE CANCER (HCC): Primary | ICD-10-CM

## 2018-10-31 PROCEDURE — 76872 US TRANSRECTAL: CPT | Performed by: UROLOGY

## 2018-10-31 PROCEDURE — 55700 PR PROSTATE NEEDLE BIOPSY ANY APPROACH: CPT | Performed by: UROLOGY

## 2018-10-31 PROCEDURE — 76942 ECHO GUIDE FOR BIOPSY: CPT | Performed by: UROLOGY

## 2018-10-31 PROCEDURE — 96372 THER/PROPH/DIAG INJ SC/IM: CPT | Performed by: UROLOGY

## 2018-10-31 RX ORDER — CEFTRIAXONE 1 G/1
1 INJECTION, POWDER, FOR SOLUTION INTRAMUSCULAR; INTRAVENOUS ONCE
Status: COMPLETED | OUTPATIENT
Start: 2018-10-31 | End: 2018-10-31

## 2018-10-31 RX ADMIN — CEFTRIAXONE 1 G: 1 INJECTION, POWDER, FOR SOLUTION INTRAMUSCULAR; INTRAVENOUS at 10:07

## 2018-10-31 NOTE — PROGRESS NOTES
Chief Complaint:          Elevated psa    HPI:   68 y.o. male.  Pt's psa is 4.9 and his free psa is only 11%.  Family history of prostate cancer in his brother and his son.  HPI      Past Medical History:        Past Medical History:   Diagnosis Date   • Anxiety    • Elevated PSA    • Encounter for monitoring sotalol therapy    • Hyperlipidemia    • Hypertension    • Obesity    • Paroxysmal A-fib (CMS/HCC)          Current Meds:     Current Outpatient Prescriptions   Medication Sig Dispense Refill   • ALPRAZolam (XANAX) 0.5 MG tablet Bring to office for procedure. Staff will instruct on dose and timing. 3 tablet 0   • apixaban (ELIQUIS) 5 MG tablet tablet Take 1 tablet by mouth 2 (Two) Times a Day. 60 tablet 2   • Blood Glucose Monitoring Suppl (TRUE METRIX METER) w/Device kit      • levoFLOXacin (LEVAQUIN) 500 MG tablet Take 1 tablet by mouth daily for three days starting the day prior to procedure. 3 tablet 0   • lisinopril (PRINIVIL,ZESTRIL) 10 MG tablet Take 10 mg by mouth daily.     • LORazepam (ATIVAN) 1 MG tablet      • metFORMIN (GLUCOPHAGE) 500 MG tablet Take 500 mg by mouth Daily With Breakfast.     • metFORMIN ER (GLUCOPHAGE-XR) 500 MG 24 hr tablet      • sertraline (ZOLOFT) 100 MG tablet Take 1 tablet by mouth Daily. 90 tablet 1   • sotalol (BETAPACE) 80 MG tablet Take 80 mg by mouth 2 (two) times a day.     • TRUE METRIX BLOOD GLUCOSE TEST test strip      • TRUEPLUS LANCETS 30G misc        No current facility-administered medications for this visit.         Allergies:      No Known Allergies     Past Surgical History:     Past Surgical History:   Procedure Laterality Date   • CARDIOVASCULAR STRESS TEST  2012   • CATARACT EXTRACTION     • ECHO - CONVERTED  2012         Social History:     Social History     Social History   • Marital status:      Spouse name: N/A   • Number of children: N/A   • Years of education: N/A     Occupational History   • Not on file.     Social History Main Topics   •  "Smoking status: Former Smoker     Packs/day: 0.50     Years: 3.00     Types: Cigarettes     Quit date: 2006   • Smokeless tobacco: Former User     Types: Chew   • Alcohol use No   • Drug use: No   • Sexual activity: Defer     Other Topics Concern   • Not on file     Social History Narrative   • No narrative on file       Family History:     Family History   Problem Relation Age of Onset   • No Known Problems Mother    • Lung cancer Father    • Prostate cancer Brother    • Heart disease Sister        Review of Systems:     Review of Systems   Constitutional: Negative for chills and fever.   Respiratory: Negative for chest tightness, shortness of breath and wheezing.    Cardiovascular: Negative for chest pain.   Gastrointestinal: Negative for abdominal pain, nausea and vomiting.   Genitourinary: Negative for difficulty urinating, flank pain, frequency and urgency.   Musculoskeletal: Negative for back pain.   Neurological: Negative for dizziness, weakness and numbness.   Psychiatric/Behavioral: Negative for confusion.       Physical Exam    Ht 180.3 cm (70.98\")   Wt 109 kg (240 lb)   BMI 33.49 kg/m²    Procedure:   Procedure: Transrectal Ultrasound and Guided Biopsies    Position: Fetal/left lateral decubitus    Prostate Ultrasound Guided lidocaine prostate block    Sedation: Oral xanax    Indications: strong family history of prostate cancer and psa of 4.9% and 11% free psa    Procedures risks and benefits and risk and alternatives were discussed with the patient. Written Consent was obtained prior to procedure.    In patients without penicillin allergies, preoperative Oral antibiotics and pre-procedure rocephin administered.    Procedure details: 8 Mhz biplanar B&K probe was placed in the rectum. Prostate was scanned from the base of the bladder to the apex. Prostate size was measured at 33.9 cc prosthetic height 33.4 mm    width 42.5 mm   length 45.7mm    Seminal vesicals: normal  Prostate median lobe " normal    Patient is to return in 1 week to discuss pathology.        Assessment:     Encounter Diagnoses   Name Primary?   • Surgical aftercare, genitourinary system    • Prostate cancer (CMS/HCC) Yes     No orders of the defined types were placed in this encounter.      Plan:   We'll see the patient back in a week to discuss pathology  This document has been electronically signed by Kevin Wilburn MD October 31, 2018 10:54 AM

## 2018-11-07 ENCOUNTER — OFFICE VISIT (OUTPATIENT)
Dept: UROLOGY | Facility: CLINIC | Age: 68
End: 2018-11-07

## 2018-11-07 VITALS — BODY MASS INDEX: 33.6 KG/M2 | HEIGHT: 71 IN | WEIGHT: 240 LBS

## 2018-11-07 DIAGNOSIS — C61 PROSTATE CANCER (HCC): Primary | ICD-10-CM

## 2018-11-07 PROCEDURE — 99215 OFFICE O/P EST HI 40 MIN: CPT | Performed by: UROLOGY

## 2018-11-07 NOTE — PROGRESS NOTES
Chief Complaint:          Prostate cancer.    HPI:   68 y.o. male.  Pt's psa is 4.9 and his free psa is only 11%.  Family history of prostate cancer in his brother and his son  His prostate volume was 34 cc..  He had 5 of 6 biopsies on the right positive for grade 3 + 3 equals score of 6 and 5 of six biopsies on left positive for prostate cancer.  HPI      Past Medical History:        Past Medical History:   Diagnosis Date   • Anxiety    • Elevated PSA    • Encounter for monitoring sotalol therapy    • Hyperlipidemia    • Hypertension    • Obesity    • Paroxysmal A-fib (CMS/HCC)          Current Meds:     Current Outpatient Prescriptions   Medication Sig Dispense Refill   • apixaban (ELIQUIS) 5 MG tablet tablet Take 1 tablet by mouth 2 (Two) Times a Day. 60 tablet 2   • Blood Glucose Monitoring Suppl (TRUE METRIX METER) w/Device kit      • lisinopril (PRINIVIL,ZESTRIL) 10 MG tablet Take 10 mg by mouth daily.     • LORazepam (ATIVAN) 1 MG tablet      • metFORMIN (GLUCOPHAGE) 500 MG tablet Take 500 mg by mouth Daily With Breakfast.     • metFORMIN ER (GLUCOPHAGE-XR) 500 MG 24 hr tablet      • sertraline (ZOLOFT) 100 MG tablet Take 1 tablet by mouth Daily. 90 tablet 1   • sotalol (BETAPACE) 80 MG tablet Take 80 mg by mouth 2 (two) times a day.     • TRUE METRIX BLOOD GLUCOSE TEST test strip      • TRUEPLUS LANCETS 30G misc      • ALPRAZolam (XANAX) 0.5 MG tablet Bring to office for procedure. Staff will instruct on dose and timing. 3 tablet 0   • levoFLOXacin (LEVAQUIN) 500 MG tablet Take 1 tablet by mouth daily for three days starting the day prior to procedure. 3 tablet 0     No current facility-administered medications for this visit.         Allergies:      No Known Allergies     Past Surgical History:     Past Surgical History:   Procedure Laterality Date   • CARDIOVASCULAR STRESS TEST  2012   • CATARACT EXTRACTION     • ECHO - CONVERTED  2012         Social History:     Social History     Social History   •  Marital status:      Spouse name: N/A   • Number of children: N/A   • Years of education: N/A     Occupational History   • Not on file.     Social History Main Topics   • Smoking status: Former Smoker     Packs/day: 0.50     Years: 3.00     Types: Cigarettes     Quit date: 2006   • Smokeless tobacco: Former User     Types: Chew   • Alcohol use No   • Drug use: No   • Sexual activity: Defer     Other Topics Concern   • Not on file     Social History Narrative   • No narrative on file       Family History:     Family History   Problem Relation Age of Onset   • No Known Problems Mother    • Lung cancer Father    • Prostate cancer Brother    • Heart disease Sister        Review of Systems:     Review of Systems   Constitutional: Negative for chills, fatigue and fever.   Respiratory: Negative for cough, shortness of breath and wheezing.    Cardiovascular: Negative for leg swelling.   Gastrointestinal: Negative for abdominal pain, nausea and vomiting.   Musculoskeletal: Negative for back pain and joint swelling.   Neurological: Negative for dizziness and headaches.   Psychiatric/Behavioral: Negative for confusion.         I have reviewed the follow portions of the patient's history and confirmed they are accurate today:  allergies, current medications, past family history, past medical history, past social history, past surgical history, problem list and ROS  Physical Exam:     Physical Exam   Constitutional: He is oriented to person, place, and time.   HENT:   Head: Normocephalic and atraumatic.   Right Ear: External ear normal.   Left Ear: External ear normal.   Nose: Nose normal.   Mouth/Throat: Oropharynx is clear and moist.   Eyes: Pupils are equal, round, and reactive to light. Conjunctivae and EOM are normal.   Neck: Normal range of motion. Neck supple. No thyromegaly present.   Cardiovascular: Normal rate, regular rhythm, normal heart sounds and intact distal pulses.    No murmur heard.  Pulmonary/Chest:  "Effort normal and breath sounds normal. No respiratory distress. He has no wheezes. He has no rales. He exhibits no tenderness.   Abdominal: Soft. Bowel sounds are normal. He exhibits no distension and no mass. There is no tenderness. No hernia.   Genitourinary: Rectum normal, prostate normal and penis normal.   Musculoskeletal: Normal range of motion. He exhibits no edema or tenderness.   Lymphadenopathy:     He has no cervical adenopathy.   Neurological: He is alert and oriented to person, place, and time. No cranial nerve deficit. He exhibits normal muscle tone. Coordination normal.   Skin: Skin is warm. No rash noted.   Psychiatric: He has a normal mood and affect. His behavior is normal. Judgment and thought content normal.   Nursing note and vitals reviewed.      Ht 180.3 cm (70.98\")   Wt 109 kg (240 lb)   BMI 33.49 kg/m²    Procedure:         Assessment:     Encounter Diagnosis   Name Primary?   • Prostate cancer (CMS/HCC) Yes     No orders of the defined types were placed in this encounter.      Plan:   Pt has stage T1c prostate cancer.  He had 10 oppf 12 biopsies positive for grade 3 + 3 prostate cancer.  We discussed observation vs treatment.  We discussed robotic or open prostatectomy,  Radiation therapy vs seeds.  Ablation therapy cryosurgery vs HIFU.  Vs cryosurgery.    Counseling was given to patient for the following topics diagnostic results including: pathology results. The interim medical history and current results were reviewed.  A treatment plan with follow-up was made for Prostate cancer (CMS/HCC) [C61]. I spent 65 minutes face to face with Miki Jett and 90 percentage was spent in counseling.    This document has been electronically signed by Kevin Wilburn MD November 7, 2018 5:33 PM  "

## 2018-12-28 ENCOUNTER — OFFICE VISIT (OUTPATIENT)
Dept: CARDIOLOGY | Facility: CLINIC | Age: 68
End: 2018-12-28

## 2018-12-28 VITALS
BODY MASS INDEX: 33.6 KG/M2 | DIASTOLIC BLOOD PRESSURE: 80 MMHG | HEIGHT: 71 IN | WEIGHT: 240 LBS | HEART RATE: 75 BPM | SYSTOLIC BLOOD PRESSURE: 140 MMHG | OXYGEN SATURATION: 98 %

## 2018-12-28 DIAGNOSIS — I10 ESSENTIAL HYPERTENSION: ICD-10-CM

## 2018-12-28 DIAGNOSIS — Z79.899 ENCOUNTER FOR MONITORING SOTALOL THERAPY: ICD-10-CM

## 2018-12-28 DIAGNOSIS — Z51.81 ENCOUNTER FOR MONITORING SOTALOL THERAPY: ICD-10-CM

## 2018-12-28 DIAGNOSIS — I48.0 PAROXYSMAL A-FIB (HCC): Primary | ICD-10-CM

## 2018-12-28 DIAGNOSIS — E78.2 MIXED HYPERLIPIDEMIA: ICD-10-CM

## 2018-12-28 DIAGNOSIS — Z79.01 CHRONIC ANTICOAGULATION: ICD-10-CM

## 2018-12-28 PROBLEM — C61 PROSTATE CANCER (HCC): Status: ACTIVE | Noted: 2018-12-28

## 2018-12-28 PROBLEM — E11.9 CONTROLLED TYPE 2 DIABETES MELLITUS WITHOUT COMPLICATION, WITHOUT LONG-TERM CURRENT USE OF INSULIN: Status: ACTIVE | Noted: 2018-12-28

## 2018-12-28 PROCEDURE — 93000 ELECTROCARDIOGRAM COMPLETE: CPT | Performed by: INTERNAL MEDICINE

## 2018-12-28 PROCEDURE — 99213 OFFICE O/P EST LOW 20 MIN: CPT | Performed by: INTERNAL MEDICINE

## 2018-12-28 NOTE — PROGRESS NOTES
subjective     Chief Complaint   Patient presents with   • Paroxysmal atrial fibrillation   • Follow-up     History of Present Illness  Patient is 68 years old white male who has paroxysmal atrial fibrillation.  He has been anticoagulated with eliquis 5 mg twice a day.  He is maintaining sinus rhythm with sotalol therapy.  Chads VASC score is 3.  He has had no palpitations.  No drug side effects    Other problems consist of hypertension and hyperlipidemia and is taking his medications regularly.    Patient complains of sinus congestion and runny nose no fever or chills no cough.    Reason also states that he was diagnosed with the prostate cancer.  He hasfor complaints.    Past Surgical History:   Procedure Laterality Date   • CARDIOVASCULAR STRESS TEST     • CATARACT EXTRACTION     • ECHO - CONVERTED       Family History   Problem Relation Age of Onset   • No Known Problems Mother    • Lung cancer Father    • Prostate cancer Brother    • Heart disease Sister      Past Medical History:   Diagnosis Date   • Anxiety    • Elevated PSA    • Encounter for monitoring sotalol therapy    • Hyperlipidemia    • Hypertension    • Obesity    • Paroxysmal A-fib (CMS/HCC)      Patient Active Problem List   Diagnosis   • Paroxysmal a-fib 2006 [facility] currently in sinus rhythm.[id number]   • Encounter for monitoring sotalol therapy   • Hypertension   • Hyperlipidemia   • Obesity   • Controlled type 2 diabetes mellitus without complication, without long-term current use of insulin (CMS/HCC)   • Prostate cancer (CMS/HCC)   • Chronic anticoagulation with eliquis       Social History     Tobacco Use   • Smoking status: Former Smoker     Packs/day: 0.50     Years: 3.00     Pack years: 1.50     Types: Cigarettes     Last attempt to quit:      Years since quittin.9   • Smokeless tobacco: Former User     Types: Chew   Substance Use Topics   • Alcohol use: No   • Drug use: No       No  Known Allergies    Current Outpatient Medications on File Prior to Visit   Medication Sig   • ALPRAZolam (XANAX) 0.5 MG tablet Bring to office for procedure. Staff will instruct on dose and timing.   • apixaban (ELIQUIS) 5 MG tablet tablet Take 1 tablet by mouth 2 (Two) Times a Day.   • Blood Glucose Monitoring Suppl (TRUE METRIX METER) w/Device kit    • lisinopril (PRINIVIL,ZESTRIL) 10 MG tablet Take 10 mg by mouth daily.   • LORazepam (ATIVAN) 1 MG tablet    • metFORMIN (GLUCOPHAGE) 500 MG tablet Take 1,000 mg by mouth Daily With Breakfast.   • sertraline (ZOLOFT) 100 MG tablet Take 1 tablet by mouth Daily.   • SITagliptin (JANUVIA) 100 MG tablet Take 100 mg by mouth Every Morning.   • sotalol (BETAPACE) 80 MG tablet Take 80 mg by mouth 2 (two) times a day.   • TRUE METRIX BLOOD GLUCOSE TEST test strip    • TRUEPLUS LANCETS 30G misc    • levoFLOXacin (LEVAQUIN) 500 MG tablet Take 1 tablet by mouth daily for three days starting the day prior to procedure.   • metFORMIN ER (GLUCOPHAGE-XR) 500 MG 24 hr tablet      No current facility-administered medications on file prior to visit.          The following portions of the patient's history were reviewed and updated as appropriate: allergies, current medications, past family history, past medical history, past social history, past surgical history and problem list.    Review of Systems   Constitution: Negative.   HENT: Positive for congestion.    Eyes: Negative.    Cardiovascular: Negative.  Negative for chest pain, cyanosis, dyspnea on exertion, irregular heartbeat, leg swelling, near-syncope, orthopnea, palpitations, paroxysmal nocturnal dyspnea and syncope.   Respiratory: Negative.  Negative for shortness of breath.    Hematologic/Lymphatic: Negative.    Musculoskeletal: Negative.    Gastrointestinal: Negative.    Neurological: Negative.  Negative for headaches.          Objective:     /80 (BP Location: Left arm, Patient Position: Sitting, Cuff Size: Adult)    "Pulse 75   Ht 180.3 cm (71\")   Wt 109 kg (240 lb)   SpO2 98%   BMI 33.47 kg/m²   Physical Exam   Constitutional: He appears well-developed and well-nourished. No distress.   HENT:   Head: Normocephalic and atraumatic.   Mouth/Throat: Oropharynx is clear and moist. No oropharyngeal exudate.   Eyes: Conjunctivae and EOM are normal. Pupils are equal, round, and reactive to light. No scleral icterus.   Neck: Normal range of motion. Neck supple. No JVD present. No tracheal deviation present. No thyromegaly present.   Cardiovascular: Normal rate, regular rhythm, normal heart sounds and intact distal pulses. PMI is not displaced. Exam reveals no gallop, no friction rub and no decreased pulses.   No murmur heard.  Pulses:       Carotid pulses are 3+ on the right side, and 3+ on the left side.       Radial pulses are 3+ on the right side, and 3+ on the left side.   Pulmonary/Chest: Effort normal and breath sounds normal. No respiratory distress. He has no wheezes. He has no rales. He exhibits no tenderness.   Abdominal: Soft. Bowel sounds are normal. He exhibits no distension, no abdominal bruit and no mass. There is no splenomegaly or hepatomegaly. There is no tenderness. There is no rebound and no guarding.   Musculoskeletal: Normal range of motion. He exhibits no edema, tenderness or deformity.   Lymphadenopathy:     He has no cervical adenopathy.   Neurological: He is alert. He has normal reflexes. No cranial nerve deficit. He exhibits normal muscle tone. Coordination normal.   Skin: Skin is warm and dry. No rash noted. He is not diaphoretic. No erythema.   Psychiatric: He has a normal mood and affect. His behavior is normal. Judgment and thought content normal.         Lab Review      ECG 12 Lead  Date/Time: 12/28/2018 12:21 PM  Performed by: Mike Olson MD  Authorized by: Mike Olson MD   Comparison: compared with previous ECG from 7/23/2018  Similar to previous ECG  Rhythm: sinus " rhythm  Rate: normal  BPM: 72  ST Segments: ST segments normal  T Waves: T waves normal  QRS axis: normal  Other: no other findings  Clinical impression: normal ECG               I personally viewed and interpreted the patient's LAB data         Assessment:     1. Paroxysmal a-fib February 2006 leg [facility] currently in sinus rhythm.[id number] 3    2. Essential hypertension    3. Mixed hyperlipidemia    4. Encounter for monitoring sotalol therapy    5. Chronic anticoagulation with eliquis          Plan:      Patient has paroxysmal atrial fibrillation since 2006.  He has had no further palpitations.  He is maintaining sinus rhythm with sotalol therapy  EKG today is normal with no QTC prolongation  Blood pressure is also very well controlled.  He is anticoagulated with eliquis with no drug side effects.  No bleeding.    Patient recently was discovered to have prosthetic CA and is doing very well following closely with urologist.  Patient states that he has tried some Sudafed for sinus congestion.  He was advised not to take Sudafed and take Zyrtec.        No Follow-up on file.

## 2019-03-20 ENCOUNTER — OFFICE VISIT (OUTPATIENT)
Dept: PSYCHIATRY | Facility: CLINIC | Age: 69
End: 2019-03-20

## 2019-03-20 VITALS
HEART RATE: 73 BPM | HEIGHT: 71 IN | BODY MASS INDEX: 34.13 KG/M2 | SYSTOLIC BLOOD PRESSURE: 147 MMHG | WEIGHT: 243.8 LBS | DIASTOLIC BLOOD PRESSURE: 82 MMHG

## 2019-03-20 DIAGNOSIS — F41.1 GENERALIZED ANXIETY DISORDER: Primary | ICD-10-CM

## 2019-03-20 DIAGNOSIS — Z79.899 MEDICATION MANAGEMENT: ICD-10-CM

## 2019-03-20 PROCEDURE — 90792 PSYCH DIAG EVAL W/MED SRVCS: CPT | Performed by: NURSE PRACTITIONER

## 2019-03-20 RX ORDER — PANTOPRAZOLE SODIUM 40 MG/1
40 TABLET, DELAYED RELEASE ORAL DAILY
Refills: 3 | COMMUNITY
Start: 2019-03-16

## 2019-03-20 RX ORDER — INSULIN DEGLUDEC INJECTION 100 U/ML
INJECTION, SOLUTION SUBCUTANEOUS
Refills: 3 | COMMUNITY
Start: 2019-02-15 | End: 2020-06-01 | Stop reason: ALTCHOICE

## 2019-03-20 RX ORDER — SERTRALINE HYDROCHLORIDE 100 MG/1
100 TABLET, FILM COATED ORAL DAILY
Qty: 90 TABLET | Refills: 1 | Status: SHIPPED | OUTPATIENT
Start: 2019-03-20 | End: 2019-10-02 | Stop reason: SDUPTHER

## 2019-03-20 RX ORDER — BLOOD-GLUCOSE METER
EACH MISCELLANEOUS DAILY
Refills: 0 | COMMUNITY
Start: 2019-02-15

## 2019-03-20 NOTE — PROGRESS NOTES
"Subjective   Patient ID: Miki Jett is a 68 y.o. male is here today for medication management.      /82   Pulse 73   Ht 180.3 cm (71\")   Wt 111 kg (243 lb 12.8 oz)   BMI 34.00 kg/m²     Patient has no known allergies.  CC: Anxiety  History of Present Illness   Patient reports that he has been doing well.  He denies any anxiety or depressive symptoms currently.  No suicidal or homicidal ideations.  No auditory or visual hallucinations.  He reports staying active for the most part.  He shares that if he has down time it only gives his mind time to wander which increases his anxiety.  Patient is compliant with medication, denies any side effects.  He is overall tolerating and mood is stable.  He reports sleeping well and appetite is good.  Denies any medical concerns currently for stressors.  He reports he has been doing squirrel hunting, trains squirrel dogs. He enjoys camping with his wife. He is retired and works when he likes, keeps self busy. He also enjoys fishing.     The following portions of the patient's history were reviewed and updated as appropriate: allergies, current medications, past family history, past medical history, past social history, past surgical history and problem list.       Review of Systems   Constitutional: Negative.  Negative for activity change, appetite change and fatigue.   HENT: Negative.    Eyes: Negative.  Negative for visual disturbance.   Respiratory: Negative.    Cardiovascular: Negative.    Gastrointestinal: Negative.  Negative for nausea.   Endocrine: Negative.    Genitourinary: Negative.    Musculoskeletal: Negative for arthralgias.   Skin: Negative.    Allergic/Immunologic: Negative.    Neurological: Negative for dizziness, seizures and headaches.   Hematological: Negative.    Psychiatric/Behavioral: Negative for agitation, behavioral problems, confusion, decreased concentration, dysphoric mood, hallucinations, self-injury, sleep disturbance and suicidal " ideas. The patient is not nervous/anxious and is not hyperactive.        Objective   Mental Status Exam  Appearance:  clean and casually dressed, appropriate  Attitude toward clinician:  cooperative and agreeable   Speech:    Rate:  regular rate and rhythm   Volume:  normal  Motor:  no abnormal movements present  Mood:  Good  Affect:  euthymic  Thought Processes:  linear, logical, and goal directed  Thought Content:  normal  Suicidal Thoughts:  absent  Homicidal Thoughts:  absent  Perceptual Disturbance: no perceptual disturbance  Attention and Concentration:  good  Insight and Judgement:  good  Memory:  memory appears to be intact    MEDICATION ISSUES: denies any  Current Outpatient Medications on File Prior to Visit   Medication Sig Dispense Refill   • apixaban (ELIQUIS) 5 MG tablet tablet Take 1 tablet by mouth 2 (Two) Times a Day. 60 tablet 2   • Blood Glucose Monitoring Suppl (TRUE METRIX METER) w/Device kit      • EASY COMFORT PEN NEEDLES 31G X 8 MM misc Daily. as directed  0   • lisinopril (PRINIVIL,ZESTRIL) 10 MG tablet Take 10 mg by mouth daily.     • LORazepam (ATIVAN) 1 MG tablet      • metFORMIN (GLUCOPHAGE) 500 MG tablet Take 1,000 mg by mouth Daily With Breakfast.     • metFORMIN ER (GLUCOPHAGE-XR) 500 MG 24 hr tablet      • pantoprazole (PROTONIX) 40 MG EC tablet Take 40 mg by mouth Daily.  3   • SITagliptin (JANUVIA) 100 MG tablet Take 100 mg by mouth Every Morning.     • sotalol (BETAPACE) 80 MG tablet Take 80 mg by mouth 2 (two) times a day.     • TRESIBA FLEXTOUCH 100 UNIT/ML solution pen-injector injection INJECT 16 UNITS SUB Q EVERY DAY  3   • TRUE METRIX BLOOD GLUCOSE TEST test strip      • TRUEPLUS LANCETS 30G misc      • [DISCONTINUED] ALPRAZolam (XANAX) 0.5 MG tablet Bring to office for procedure. Staff will instruct on dose and timing. 3 tablet 0   • [DISCONTINUED] sertraline (ZOLOFT) 100 MG tablet Take 1 tablet by mouth Daily. 90 tablet 1   • levoFLOXacin (LEVAQUIN) 500 MG tablet Take 1  tablet by mouth daily for three days starting the day prior to procedure. 3 tablet 0     No current facility-administered medications on file prior to visit.      Past Medical History:   Diagnosis Date   • Anxiety    • Cancer (CMS/[facility]) 10/2018    Prostate cancer   • Elevated PSA    • Encounter for monitoring sotalol therapy    • Hyperlipidemia    • Hypertension    • Obesity    • Paroxysmal A-fib (CMS/[facility])      Family History   Problem Relation Age of Onset   • No Known Problems Mother    • Lung cancer Father    • Prostate cancer Brother    • Heart disease Sister      Social History     Tobacco Use   • Smoking status: Former Smoker     Packs/day: 0.50     Years: 3.00     Pack years: 1.50     Types: Cigarettes     Last attempt to quit:      Years since quittin.2   • Smokeless tobacco: Former User     Types: Chew   Substance Use Topics   • Alcohol use: No   • Drug use: No         Lab Review:   No visits with results within 2 Month(s) from this visit.   Latest known visit with results is:   Appointment on 10/03/2018   Component Date Value   • PSA 10/03/2018 4.9*   • PSA, Free 10/03/2018 0.55    • PSA, Free % 10/03/2018 11.2      Assessment/Plan   Diagnoses and all orders for this visit:    Generalized anxiety disorder  -     sertraline (ZOLOFT) 100 MG tablet; Take 1 tablet by mouth Daily.    Medication management       Impression: mood stable, asymptomatic  Prognosis: good, symptoms resolution on current meds  Body mass index is 34 kg/m².  Patient was educated on healthier and more balanced diet choices and encouraged exercise within physical limitations.  We will continue Zoloft for anxiety. RBSE of meds discussed and pt agrees to tx plan.   Return in about 6 months (around 2019), or if symptoms worsen or fail to improve, for Recheck, Next scheduled follow up.

## 2019-03-28 ENCOUNTER — OFFICE VISIT (OUTPATIENT)
Dept: CARDIOLOGY | Facility: CLINIC | Age: 69
End: 2019-03-28

## 2019-03-28 VITALS
SYSTOLIC BLOOD PRESSURE: 138 MMHG | OXYGEN SATURATION: 98 % | WEIGHT: 241 LBS | HEIGHT: 71 IN | HEART RATE: 69 BPM | DIASTOLIC BLOOD PRESSURE: 76 MMHG | BODY MASS INDEX: 33.74 KG/M2

## 2019-03-28 DIAGNOSIS — Z79.01 CHRONIC ANTICOAGULATION: ICD-10-CM

## 2019-03-28 DIAGNOSIS — E66.09 CLASS 1 OBESITY DUE TO EXCESS CALORIES WITHOUT SERIOUS COMORBIDITY WITH BODY MASS INDEX (BMI) OF 33.0 TO 33.9 IN ADULT: ICD-10-CM

## 2019-03-28 DIAGNOSIS — I48.0 PAROXYSMAL A-FIB (HCC): Primary | ICD-10-CM

## 2019-03-28 PROCEDURE — 99213 OFFICE O/P EST LOW 20 MIN: CPT | Performed by: INTERNAL MEDICINE

## 2019-03-28 NOTE — PROGRESS NOTES
subjective     Chief Complaint   Patient presents with   • Atrial Fibrillation   • Follow-up     History of Present Illness  Patient is 68 years old white male who has a history of paroxysmal atrial fibrillation with [id number] score of 3.  He is anticoagulated with Eliquis.  And is maintaining sinus rhythm with Betapace.  He is doing very well denies any palpitations.  He has no chest pain or shortness of breath.  Blood pressure is very well controlled with lisinopril.  Patient also is diabetic and is on metformin.    Past Surgical History:   Procedure Laterality Date   • CARDIOVASCULAR STRESS TEST     • CATARACT EXTRACTION     • ECHO - CONVERTED       Family History   Problem Relation Age of Onset   • No Known Problems Mother    • Lung cancer Father    • Prostate cancer Brother    • Heart disease Sister      Past Medical History:   Diagnosis Date   • Anxiety    • Cancer (CMS/[facility]) 10/2018    Prostate cancer   • Elevated PSA    • Encounter for monitoring sotalol therapy    • Hyperlipidemia    • Hypertension    • Obesity    • Paroxysmal A-fib (CMS/[facility])      Patient Active Problem List   Diagnosis   • Paroxysmal a-fib 2006 leg [facility] currently in sinus rhythm.[id number] 3   • Encounter for monitoring sotalol therapy   • Hypertension   • Hyperlipidemia   • Obesity   • Controlled type 2 diabetes mellitus without complication, without long-term current use of insulin (CMS/[facility])   • Prostate cancer (CMS/[facility])   • Chronic anticoagulation with eliquis       Social History     Tobacco Use   • Smoking status: Former Smoker     Packs/day: 0.50     Years: 3.00     Pack years: 1.50     Types: Cigarettes     Last attempt to quit:      Years since quittin.2   • Smokeless tobacco: Former User     Types: Chew   Substance Use Topics   • Alcohol use: No   • Drug use: No       No Known Allergies    Current Outpatient Medications on File Prior to Visit   Medication Sig   • apixaban  (ELIQUIS) 5 MG tablet tablet Take 1 tablet by mouth 2 (Two) Times a Day.   • Blood Glucose Monitoring Suppl (TRUE METRIX METER) w/Device kit    • EASY COMFORT PEN NEEDLES 31G X 8 MM misc Daily. as directed   • levoFLOXacin (LEVAQUIN) 500 MG tablet Take 1 tablet by mouth daily for three days starting the day prior to procedure.   • lisinopril (PRINIVIL,ZESTRIL) 10 MG tablet Take 10 mg by mouth daily.   • LORazepam (ATIVAN) 1 MG tablet    • metFORMIN (GLUCOPHAGE) 500 MG tablet Take 500 mg by mouth 2 (Two) Times a Day With Meals.   • pantoprazole (PROTONIX) 40 MG EC tablet Take 40 mg by mouth Daily.   • sertraline (ZOLOFT) 100 MG tablet Take 1 tablet by mouth Daily.   • sotalol (BETAPACE) 80 MG tablet Take 80 mg by mouth 2 (two) times a day.   • TRESIBA FLEXTOUCH 100 UNIT/ML solution pen-injector injection INJECT 24 UNITS SUB Q EVERY DAY   • TRUE METRIX BLOOD GLUCOSE TEST test strip    • TRUEPLUS LANCETS 30G misc    • [DISCONTINUED] metFORMIN ER (GLUCOPHAGE-XR) 500 MG 24 hr tablet    • [DISCONTINUED] SITagliptin (JANUVIA) 100 MG tablet Take 100 mg by mouth Every Morning.     No current facility-administered medications on file prior to visit.          The following portions of the patient's history were reviewed and updated as appropriate: allergies, current medications, past family history, past medical history, past social history, past surgical history and problem list.    Review of Systems   Constitution: Negative.   HENT: Negative.  Negative for congestion.    Eyes: Negative.    Cardiovascular: Negative.  Negative for chest pain, cyanosis, dyspnea on exertion, irregular heartbeat, leg swelling, near-syncope, orthopnea, palpitations, paroxysmal nocturnal dyspnea and syncope.   Respiratory: Negative.  Negative for shortness of breath.    Hematologic/Lymphatic: Negative.    Musculoskeletal: Negative.    Gastrointestinal: Negative.    Neurological: Negative.  Negative for headaches.          Objective:     /76  "(BP Location: Left arm, Patient Position: Sitting)   Pulse 69   Ht 180.3 cm (71\")   Wt 109 kg (241 lb)   SpO2 98%   BMI 33.61 kg/m²   Physical Exam   Constitutional: He appears well-developed and well-nourished. No distress.   HENT:   Head: Normocephalic and atraumatic.   Mouth/Throat: Oropharynx is clear and moist. No oropharyngeal exudate.   Eyes: Conjunctivae and EOM are normal. Pupils are equal, round, and reactive to light. No scleral icterus.   Neck: Normal range of motion. Neck supple. No JVD present. No tracheal deviation present. No thyromegaly present.   Cardiovascular: Normal rate, regular rhythm, normal heart sounds and intact distal pulses. PMI is not displaced. Exam reveals no gallop, no friction rub and no decreased pulses.   No murmur heard.  Pulses:       Carotid pulses are 3+ on the right side, and 3+ on the left side.       Radial pulses are 3+ on the right side, and 3+ on the left side.   Pulmonary/Chest: Effort normal and breath sounds normal. No respiratory distress. He has no wheezes. He has no rales. He exhibits no tenderness.   Abdominal: Soft. Bowel sounds are normal. He exhibits no distension, no abdominal bruit and no mass. There is no splenomegaly or hepatomegaly. There is no tenderness. There is no rebound and no guarding.   Musculoskeletal: Normal range of motion. He exhibits no edema, tenderness or deformity.   Lymphadenopathy:     He has no cervical adenopathy.   Neurological: He is alert. He has normal reflexes. No cranial nerve deficit. He exhibits normal muscle tone. Coordination normal.   Skin: Skin is warm and dry. No rash noted. He is not diaphoretic. No erythema.   Psychiatric: He has a normal mood and affect. His behavior is normal. Judgment and thought content normal.         Lab Review    Procedures       I personally viewed and interpreted the patient's LAB data         Assessment:     1. Paroxysmal a-fib February 2006 leg [facility] currently in " sinus rhythm.[id number] 3    2. Chronic anticoagulation with eliquis    3. Class 1 obesity due to excess calories without serious comorbidity with body mass index (BMI) of 33.0 to 33.9 in adult          Plan:     Patient was advised to continue Betapace and Eliquis.  He is currently in sinus rhythm and heart rate is very well controlled.  Weight loss was emphasized.  We will get EKG next visit.  Follow-up scheduled        No Follow-up on file.

## 2019-09-26 ENCOUNTER — OFFICE VISIT (OUTPATIENT)
Dept: CARDIOLOGY | Facility: CLINIC | Age: 69
End: 2019-09-26

## 2019-09-26 VITALS
DIASTOLIC BLOOD PRESSURE: 80 MMHG | BODY MASS INDEX: 33.74 KG/M2 | WEIGHT: 241 LBS | HEART RATE: 69 BPM | OXYGEN SATURATION: 98 % | HEIGHT: 71 IN | SYSTOLIC BLOOD PRESSURE: 136 MMHG

## 2019-09-26 DIAGNOSIS — I10 ESSENTIAL HYPERTENSION: ICD-10-CM

## 2019-09-26 DIAGNOSIS — E78.2 MIXED HYPERLIPIDEMIA: ICD-10-CM

## 2019-09-26 DIAGNOSIS — I48.0 PAROXYSMAL A-FIB (HCC): Primary | ICD-10-CM

## 2019-09-26 DIAGNOSIS — Z79.01 CHRONIC ANTICOAGULATION: ICD-10-CM

## 2019-09-26 PROCEDURE — 93000 ELECTROCARDIOGRAM COMPLETE: CPT | Performed by: INTERNAL MEDICINE

## 2019-09-26 PROCEDURE — 99214 OFFICE O/P EST MOD 30 MIN: CPT | Performed by: INTERNAL MEDICINE

## 2019-09-26 NOTE — PROGRESS NOTES
subjective     Chief Complaint   Patient presents with   • Atrial Fibrillation     Follow up, no complaints, S/P Prostatectomy 19     History of Present Illness  Patient is 69 years old white male who was diagnosed to have paroxysmal atrial fibrillation in .  Patient is anticoagulated with Eliquis 5 mg twice daily.  He is maintaining sinus rhythm with Betapace 80 mg twice daily.  He has no specific complaints.  He denies any palpitations.  No problem with Eliquis.  Patient's blood pressure is very well controlled with current medications.  He denies any chest pain palpitations or shortness of breath.    Past Surgical History:   Procedure Laterality Date   • CARDIOVASCULAR STRESS TEST     • CATARACT EXTRACTION     • ECHO - CONVERTED     • PROSTATECTOMY  2019    at [facility]      Family History   Problem Relation Age of Onset   • No Known Problems Mother    • Lung cancer Father    • Prostate cancer Brother    • Heart disease Sister      Past Medical History:   Diagnosis Date   • Anxiety    • Cancer (CMS/[facility]) 10/2018    Prostate cancer   • Elevated PSA    • Encounter for monitoring sotalol therapy    • Hyperlipidemia    • Hypertension    • Obesity    • Paroxysmal A-fib (CMS/[facility])      Patient Active Problem List   Diagnosis   • Paroxysmal a-fib 2006,currently in sinus rhythm.[id number] 3   • Encounter for monitoring sotalol therapy   • Hypertension   • Hyperlipidemia   • Obesity   • Controlled type 2 diabetes mellitus without complication, without long-term current use of insulin (CMS/[facility])   • Prostate cancer (CMS/[facility])   • Chronic anticoagulation with eliquis       Social History     Tobacco Use   • Smoking status: Former Smoker     Packs/day: 0.50     Years: 3.00     Pack years: 1.50     Types: Cigarettes     Last attempt to quit:      Years since quittin.7   • Smokeless tobacco: Former User     Types: Chew   Substance Use Topics   • Alcohol use: No   • Drug use: No       Not  on File    Current Outpatient Medications on File Prior to Visit   Medication Sig   • apixaban (ELIQUIS) 5 MG tablet tablet Take 1 tablet by mouth 2 (Two) Times a Day.   • Blood Glucose Monitoring Suppl (TRUE METRIX METER) w/Device kit    • EASY COMFORT PEN NEEDLES 31G X 8 MM misc Daily. as directed   • lisinopril (PRINIVIL,ZESTRIL) 10 MG tablet Take 10 mg by mouth daily.   • LORazepam (ATIVAN) 1 MG tablet    • metFORMIN (GLUCOPHAGE) 500 MG tablet Take 500 mg by mouth 2 (Two) Times a Day With Meals.   • pantoprazole (PROTONIX) 40 MG EC tablet Take 40 mg by mouth Daily.   • sertraline (ZOLOFT) 100 MG tablet Take 1 tablet by mouth Daily.   • sotalol (BETAPACE) 80 MG tablet Take 80 mg by mouth 2 (two) times a day.   • TRESIBA FLEXTOUCH 100 UNIT/ML solution pen-injector injection INJECT 24 UNITS SUB Q EVERY DAY   • TRUE METRIX BLOOD GLUCOSE TEST test strip    • TRUEPLUS LANCETS 30G misc    • [DISCONTINUED] levoFLOXacin (LEVAQUIN) 500 MG tablet Take 1 tablet by mouth daily for three days starting the day prior to procedure.     No current facility-administered medications on file prior to visit.          The following portions of the patient's history were reviewed and updated as appropriate: allergies, current medications, past family history, past medical history, past social history, past surgical history and problem list.    Review of Systems   Constitution: Negative.   HENT: Negative.  Negative for congestion.    Eyes: Negative.    Cardiovascular: Negative.  Negative for chest pain, cyanosis, dyspnea on exertion, irregular heartbeat, leg swelling, near-syncope, orthopnea, palpitations, paroxysmal nocturnal dyspnea and syncope.   Respiratory: Negative.  Negative for shortness of breath.    Hematologic/Lymphatic: Negative.    Musculoskeletal: Negative.    Gastrointestinal: Negative.    Neurological: Negative.  Negative for headaches.          Objective:     /80 (BP Location: Left arm, Patient Position: Sitting,  "Cuff Size: Adult)   Pulse 69   Ht 180.3 cm (71\")   Wt 109 kg (241 lb)   SpO2 98%   BMI 33.61 kg/m²   Physical Exam   Constitutional: He appears well-developed and well-nourished. No distress.   HENT:   Head: Normocephalic and atraumatic.   Mouth/Throat: Oropharynx is clear and moist. No oropharyngeal exudate.   Eyes: Conjunctivae and EOM are normal. Pupils are equal, round, and reactive to light. No scleral icterus.   Neck: Normal range of motion. Neck supple. No JVD present. No tracheal deviation present. No thyromegaly present.   Cardiovascular: Normal rate, regular rhythm, normal heart sounds and intact distal pulses. PMI is not displaced. Exam reveals no gallop, no friction rub and no decreased pulses.   No murmur heard.  Pulses:       Carotid pulses are 3+ on the right side, and 3+ on the left side.       Radial pulses are 3+ on the right side, and 3+ on the left side.   Pulmonary/Chest: Effort normal and breath sounds normal. No respiratory distress. He has no wheezes. He has no rales. He exhibits no tenderness.   Abdominal: Soft. Bowel sounds are normal. He exhibits no distension, no abdominal bruit and no mass. There is no splenomegaly or hepatomegaly. There is no tenderness. There is no rebound and no guarding.   Musculoskeletal: Normal range of motion. He exhibits no edema, tenderness or deformity.   Lymphadenopathy:     He has no cervical adenopathy.   Neurological: He is alert. He has normal reflexes. No cranial nerve deficit. He exhibits normal muscle tone. Coordination normal.   Skin: Skin is warm and dry. No rash noted. He is not diaphoretic. No erythema.   Psychiatric: He has a normal mood and affect. His behavior is normal. Judgment and thought content normal.         Lab Review  No lab work this visit    ECG 12 Lead  Date/Time: 9/26/2019 1:52 PM  Performed by: Mike Olson MD  Authorized by: Mike Olson MD   Comparison: compared with previous ECG from 12/28/2018  Similar " to previous ECG  Rhythm: sinus rhythm  Rate: normal  BPM: 67  Conduction: conduction normal  ST Segments: ST segments normal  T Waves: T waves normal  QRS axis: normal  Other: no other findings    Clinical impression: normal ECG               I personally viewed and interpreted the patient's LAB data         Assessment:     1. Paroxysmal a-fib February 2006,currently in sinus rhythm.[id number] 3    2. Essential hypertension    3. Mixed hyperlipidemia    4.      Chronic anticoagulation with Eliquis      Plan:     Patient has paroxysmal atrial fibrillation he is been in sinus rhythm on sotalol therapy.  He is also anticoagulated with Eliquis.  Currently he is completely asymptomatic.  Denies any palpitations no dizziness syncope or near syncope.  He also denies any chest pain palpitations or shortness of breath.  Blood pressure is also very well controlled.  Plan no change in therapy was made.  Follow-up scheduled in 6 months        No Follow-up on file.

## 2019-10-02 ENCOUNTER — OFFICE VISIT (OUTPATIENT)
Dept: PSYCHIATRY | Facility: CLINIC | Age: 69
End: 2019-10-02

## 2019-10-02 VITALS
DIASTOLIC BLOOD PRESSURE: 79 MMHG | SYSTOLIC BLOOD PRESSURE: 132 MMHG | HEART RATE: 72 BPM | HEIGHT: 71 IN | BODY MASS INDEX: 33.85 KG/M2 | WEIGHT: 241.8 LBS

## 2019-10-02 DIAGNOSIS — F41.1 GENERALIZED ANXIETY DISORDER: Primary | ICD-10-CM

## 2019-10-02 DIAGNOSIS — Z79.899 MEDICATION MANAGEMENT: ICD-10-CM

## 2019-10-02 PROCEDURE — 90792 PSYCH DIAG EVAL W/MED SRVCS: CPT | Performed by: NURSE PRACTITIONER

## 2019-10-02 RX ORDER — SERTRALINE HYDROCHLORIDE 100 MG/1
100 TABLET, FILM COATED ORAL DAILY
Qty: 90 TABLET | Refills: 1 | Status: SHIPPED | OUTPATIENT
Start: 2019-10-02 | End: 2020-10-01

## 2019-10-02 RX ORDER — SILDENAFIL CITRATE 20 MG/1
TABLET ORAL
Refills: 0 | COMMUNITY
Start: 2019-09-02 | End: 2021-12-01

## 2019-10-02 NOTE — PROGRESS NOTES
"Subjective   Patient ID: Miki Jett is a 69 y.o. male is here today for medication management.      /79   Pulse 72   Ht 180.3 cm (71\")   Wt 110 kg (241 lb 12.8 oz)   BMI 33.72 kg/m²     Patient has no known allergies.  CC: Anxiety  History of Present Illness   Patient reports that he has been doing well.  He denies any anxiety or depressive symptoms currently.  No suicidal or homicidal ideations.  No auditory or visual hallucinations.  He reports staying active for the most part, but more recently had prostate surgery due to cancer which he is recovering without issue having to rest for 6 weeks. The surger was 19.  He shares that if he has down time it only gives his mind time to wander which increases his anxiety.  Patient is compliant with medication, denies any side effects.  He is overall tolerating and mood is stable.  He reports sleeping well and appetite is good.  Denies any medical concerns currently for stressors.  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 He enjoys camping with his wife. He is retired and works when he likes, keeps self busy. He also enjoys fishing. He is going camping in 2 weeks and looking forward to this.     The following portions of the patient's history were reviewed and updated as appropriate: allergies, current medications, past family history, past medical history, past social history, past surgical history and problem list.     Social History     Socioeconomic History   • Marital status:      Spouse name: Not on file   • Number of children: Not on file   • Years of education: Not on file   • Highest education level: Not on file   Tobacco Use   • Smoking status: Former Smoker     Packs/day: 0.50     Years: 3.00     Pack years: 1.50     Types: Cigarettes     Last attempt to quit: 2006     Years since quittin.7   • Smokeless tobacco: Former User     Types: Chew   Substance and Sexual " Activity   • Alcohol use: No   • Drug use: No   • Sexual activity: Defer     Family History   Problem Relation Age of Onset   • No Known Problems Mother    • Lung cancer Father    • Prostate cancer Brother    • Heart disease Sister      Past Medical History:   Diagnosis Date   • Anxiety    • Cancer (CMS/[facility]) 10/2018    Prostate cancer   • Elevated PSA    • Encounter for monitoring sotalol therapy    • Hyperlipidemia    • Hypertension    • Obesity    • Paroxysmal A-fib (CMS/[facility])          Review of Systems   Constitutional: Negative.  Negative for activity change, appetite change and fatigue.   HENT: Negative.    Eyes: Negative.  Negative for visual disturbance.   Respiratory: Negative.    Cardiovascular: Negative.    Gastrointestinal: Negative.  Negative for nausea.   Endocrine: Negative.    Genitourinary: Negative.    Musculoskeletal: Negative for arthralgias.   Skin: Negative.    Allergic/Immunologic: Negative.    Neurological: Negative for dizziness, seizures and headaches.   Hematological: Negative.    Psychiatric/Behavioral: Negative for agitation, behavioral problems, confusion, decreased concentration, dysphoric mood, hallucinations, self-injury, sleep disturbance and suicidal ideas. The patient is not nervous/anxious and is not hyperactive.        Objective   Mental Status Exam  Appearance:  clean and casually dressed, appropriate  Attitude toward clinician:  cooperative and agreeable   Speech:    Rate:  regular rate and rhythm   Volume:  normal  Motor:  no abnormal movements present  Mood:  Good  Affect:  euthymic  Thought Processes:  linear, logical, and goal directed  Thought Content:  normal  Suicidal Thoughts:  absent  Homicidal Thoughts:  absent  Perceptual Disturbance: no perceptual disturbance  Attention and Concentration:  good  Insight and Judgement:  good  Memory:  memory appears to be intact    MEDICATION ISSUES: denies any  Current Outpatient Medications on File Prior to Visit   Medication Sig  Dispense Refill   • apixaban (ELIQUIS) 5 MG tablet tablet Take 1 tablet by mouth 2 (Two) Times a Day. 60 tablet 2   • Blood Glucose Monitoring Suppl (TRUE METRIX METER) w/Device kit      • EASY COMFORT PEN NEEDLES 31G X 8 MM misc Daily. as directed  0   • lisinopril (PRINIVIL,ZESTRIL) 10 MG tablet Take 10 mg by mouth daily.     • LORazepam (ATIVAN) 1 MG tablet      • metFORMIN (GLUCOPHAGE) 500 MG tablet Take 500 mg by mouth 2 (Two) Times a Day With Meals.     • pantoprazole (PROTONIX) 40 MG EC tablet Take 40 mg by mouth Daily.  3   • sildenafil (REVATIO) 20 MG tablet TAKE ONE TABLET BY MOUTH EVERY NIGHT. BEGIN TAKING EVENING UNGER CATHETER IS REMOVED  0   • sotalol (BETAPACE) 80 MG tablet Take 80 mg by mouth 2 (two) times a day.     • TRESIBA FLEXTOUCH 100 UNIT/ML solution pen-injector injection INJECT 24 UNITS SUB Q EVERY DAY  3   • TRUE METRIX BLOOD GLUCOSE TEST test strip      • TRUEPLUS LANCETS 30G misc      • [DISCONTINUED] sertraline (ZOLOFT) 100 MG tablet Take 1 tablet by mouth Daily. 90 tablet 1     No current facility-administered medications on file prior to visit.      Past Medical History:   Diagnosis Date   • Anxiety    • Cancer (CMS/[facility]) 10/2018    Prostate cancer   • Elevated PSA    • Encounter for monitoring sotalol therapy    • Hyperlipidemia    • Hypertension    • Obesity    • Paroxysmal A-fib (CMS/HCC)      Family History   Problem Relation Age of Onset   • No Known Problems Mother    • Lung cancer Father    • Prostate cancer Brother    • Heart disease Sister      Social History     Tobacco Use   • Smoking status: Former Smoker     Packs/day: 0.50     Years: 3.00     Pack years: 1.50     Types: Cigarettes     Last attempt to quit:      Years since quittin.7   • Smokeless tobacco: Former User     Types: Chew   Substance Use Topics   • Alcohol use: No   • Drug use: No         Lab Review:   No visits with results within 2 Month(s) from this visit.   Latest known visit with results is:    Appointment on 10/03/2018   Component Date Value   • PSA 10/03/2018 4.9*   • PSA, Free 10/03/2018 0.55    • PSA, Free % 10/03/2018 11.2      Assessment/Plan   Diagnoses and all orders for this visit:    Generalized anxiety disorder  -     sertraline (ZOLOFT) 100 MG tablet; Take 1 tablet by mouth Daily.    Medication management       Impression: mood stable, asymptomatic  Prognosis: good, symptoms resolution on current meds  Body mass index is 33.72 kg/m².  Patient was educated on healthier and more balanced diet choices and encouraged exercise within physical limitations.  We will continue Zoloft for anxiety. RBSE of meds discussed and pt agrees to tx plan.   Return in about 6 months (around 4/2/2020), or if symptoms worsen or fail to improve, for Recheck, Next scheduled follow up.

## 2020-04-11 DIAGNOSIS — F41.1 GENERALIZED ANXIETY DISORDER: ICD-10-CM

## 2020-04-13 RX ORDER — SERTRALINE HYDROCHLORIDE 100 MG/1
TABLET, FILM COATED ORAL
Qty: 90 TABLET | Refills: 1 | OUTPATIENT
Start: 2020-04-13

## 2020-06-01 ENCOUNTER — OFFICE VISIT (OUTPATIENT)
Dept: CARDIOLOGY | Facility: CLINIC | Age: 70
End: 2020-06-01

## 2020-06-01 VITALS
SYSTOLIC BLOOD PRESSURE: 134 MMHG | TEMPERATURE: 98.8 F | OXYGEN SATURATION: 98 % | HEART RATE: 69 BPM | BODY MASS INDEX: 31.5 KG/M2 | HEIGHT: 71 IN | DIASTOLIC BLOOD PRESSURE: 76 MMHG | WEIGHT: 225 LBS

## 2020-06-01 DIAGNOSIS — Z51.81 ENCOUNTER FOR MONITORING SOTALOL THERAPY: ICD-10-CM

## 2020-06-01 DIAGNOSIS — I48.0 PAROXYSMAL A-FIB (HCC): Primary | ICD-10-CM

## 2020-06-01 DIAGNOSIS — Z79.01 CHRONIC ANTICOAGULATION: ICD-10-CM

## 2020-06-01 DIAGNOSIS — Z79.899 ENCOUNTER FOR MONITORING SOTALOL THERAPY: ICD-10-CM

## 2020-06-01 DIAGNOSIS — I10 ESSENTIAL HYPERTENSION: ICD-10-CM

## 2020-06-01 PROCEDURE — 93000 ELECTROCARDIOGRAM COMPLETE: CPT | Performed by: INTERNAL MEDICINE

## 2020-06-01 PROCEDURE — 99214 OFFICE O/P EST MOD 30 MIN: CPT | Performed by: INTERNAL MEDICINE

## 2020-06-01 NOTE — PROGRESS NOTES
subjective     Chief Complaint   Patient presents with   • Atrial Fibrillation     Follow up, denies any symptoms, pt doing well     History of Present Illness  Patient is 69 years old white male who is here for cardiology follow-up.  Patient has history of paroxysmal atrial fibrillation which first started in February 2006.  He is maintaining sinus rhythm with sotalol and also is anticoagulated with Eliquis.  Patient is doing very well from cardiac standpoint he denies any palpitations chest pain syncope or near syncope.  Blood pressure is very well controlled with lisinopril.    He has been trying to lose weight and has lost a lot of weight.  His motivation is diabetes mellitus .  He is now requiring only metformin and sugar regarding him is very well controlled.  He denies any chest pain.  Past Surgical History:   Procedure Laterality Date   • CARDIOVASCULAR STRESS TEST  2012   • CATARACT EXTRACTION     • ECHO - CONVERTED  2012   • PROSTATECTOMY  08/27/2019    at [place]      Family History   Problem Relation Age of Onset   • No Known Problems Mother    • Lung cancer Father    • Prostate cancer Brother    • Heart disease Sister      Past Medical History:   Diagnosis Date   • Anxiety    • Cancer (CMS/HCC) 10/2018    Prostate cancer   • Elevated PSA    • Encounter for monitoring sotalol therapy    • Hyperlipidemia    • Hypertension    • Obesity    • Paroxysmal A-fib (CMS/HCC)      Patient Active Problem List   Diagnosis   • Paroxysmal a-fib February 2006,currently in sinus rhythm.[id number] 3   • Encounter for monitoring sotalol therapy   • Hypertension   • Hyperlipidemia   • Obesity   • Controlled type 2 diabetes mellitus without complication, without long-term current use of insulin (CMS/HCC)   • Prostate cancer (CMS/HCC)   • Chronic anticoagulation with eliquis       Social History     Tobacco Use   • Smoking status: Former Smoker     Packs/day: 0.50     Years: 3.00     Pack years: 1.50     Types: Cigarettes      Last attempt to quit: 2006     Years since quittin.4   • Smokeless tobacco: Former User     Types: Chew   Substance Use Topics   • Alcohol use: No   • Drug use: No       No Known Allergies    Current Outpatient Medications on File Prior to Visit   Medication Sig   • apixaban (ELIQUIS) 5 MG tablet tablet Take 1 tablet by mouth 2 (Two) Times a Day.   • Blood Glucose Monitoring Suppl (TRUE METRIX METER) w/Device kit    • EASY COMFORT PEN NEEDLES 31G X 8 MM misc Daily. as directed   • lisinopril (PRINIVIL,ZESTRIL) 10 MG tablet Take 10 mg by mouth daily.   • LORazepam (ATIVAN) 1 MG tablet    • metFORMIN (GLUCOPHAGE) 500 MG tablet Take 500 mg by mouth 2 (Two) Times a Day With Meals.   • pantoprazole (PROTONIX) 40 MG EC tablet Take 40 mg by mouth Daily.   • sertraline (ZOLOFT) 100 MG tablet Take 1 tablet by mouth Daily.   • sildenafil (REVATIO) 20 MG tablet TAKE ONE TABLET BY MOUTH EVERY NIGHT. BEGIN TAKING EVENING UNGER CATHETER IS REMOVED   • sotalol (BETAPACE) 80 MG tablet Take 80 mg by mouth 2 (two) times a day.   • TRUE METRIX BLOOD GLUCOSE TEST test strip    • TRUEPLUS LANCETS 30G misc    • TRESIBA FLEXTOUCH 100 UNIT/ML solution pen-injector injection INJECT 24 UNITS SUB Q EVERY DAY     No current facility-administered medications on file prior to visit.          The following portions of the patient's history were reviewed and updated as appropriate: allergies, current medications, past family history, past medical history, past social history, past surgical history and problem list.    Review of Systems   Constitution: Negative.   HENT: Negative.  Negative for congestion.    Eyes: Negative.    Cardiovascular: Negative.  Negative for chest pain, cyanosis, dyspnea on exertion, irregular heartbeat, leg swelling, near-syncope, orthopnea, palpitations, paroxysmal nocturnal dyspnea and syncope.   Respiratory: Negative.  Negative for shortness of breath.    Hematologic/Lymphatic: Negative.    Musculoskeletal:  "Negative.    Gastrointestinal: Negative.    Neurological: Negative.  Negative for headaches.          Objective:     /76 (BP Location: Left arm, Patient Position: Sitting, Cuff Size: Adult)   Pulse 69   Temp 98.8 °F (37.1 °C)   Ht 180.3 cm (71\")   Wt 102 kg (225 lb)   SpO2 98%   BMI 31.38 kg/m²   Physical Exam   Constitutional: He appears well-developed and well-nourished. No distress.   HENT:   Head: Normocephalic and atraumatic.   Mouth/Throat: Oropharynx is clear and moist. No oropharyngeal exudate.   Eyes: Pupils are equal, round, and reactive to light. Conjunctivae and EOM are normal. No scleral icterus.   Neck: Normal range of motion. Neck supple. No JVD present. No tracheal deviation present. No thyromegaly present.   Cardiovascular: Normal rate, regular rhythm, normal heart sounds and intact distal pulses. PMI is not displaced. Exam reveals no gallop, no friction rub and no decreased pulses.   No murmur heard.  Pulses:       Carotid pulses are 3+ on the right side, and 3+ on the left side.       Radial pulses are 3+ on the right side, and 3+ on the left side.   Pulmonary/Chest: Effort normal and breath sounds normal. No respiratory distress. He has no wheezes. He has no rales. He exhibits no tenderness.   Abdominal: Soft. Bowel sounds are normal. He exhibits no distension, no abdominal bruit and no mass. There is no splenomegaly or hepatomegaly. There is no tenderness. There is no rebound and no guarding.   Musculoskeletal: Normal range of motion. He exhibits no edema, tenderness or deformity.   Lymphadenopathy:     He has no cervical adenopathy.   Neurological: He is alert. He has normal reflexes. No cranial nerve deficit. He exhibits normal muscle tone. Coordination normal.   Skin: Skin is warm and dry. No rash noted. He is not diaphoretic. No erythema.   Psychiatric: He has a normal mood and affect. His behavior is normal. Judgment and thought content normal.         Lab Review  No recent lab " available  Copy of lab requested        ECG 12 Lead  Date/Time: 6/1/2020 4:08 PM  Performed by: Mike Olson MD  Authorized by: Mike Olson MD   Comparison: compared with previous ECG from 3/28/2019  Similar to previous ECG  Rhythm: sinus rhythm  Rate: normal  BPM: 70  Conduction: conduction normal  ST Segments: ST segments normal  T Waves: T waves normal  QRS axis: normal  Other: no other findings    Clinical impression: normal ECG               I personally viewed and interpreted the patient's LAB data         Assessment:     1. Paroxysmal a-fib February 2006,currently in sinus rhythm.[id number] 3    2. Essential hypertension    3. Encounter for monitoring sotalol therapy    4. Chronic anticoagulation with eliquis          Plan:     Patient has proximal atrial fibrillation is doing very well on sotalol therapy.  EKG today shows sinus rhythm rate is well controlled  QTC is normal.  Patient was advised to continue sotalol 80 mg twice daily and continue Eliquis 5 twice daily.  Lab report from PCP will be requested  Follow-up scheduled  He is losing weight on purpose and sugar control according to him is better.  Blood pressure is also much better controlled.  COVID-19 precautions discussed        No follow-ups on file.

## 2020-12-01 ENCOUNTER — OFFICE VISIT (OUTPATIENT)
Dept: CARDIOLOGY | Facility: CLINIC | Age: 70
End: 2020-12-01

## 2020-12-01 VITALS — HEART RATE: 78 BPM | DIASTOLIC BLOOD PRESSURE: 72 MMHG | SYSTOLIC BLOOD PRESSURE: 142 MMHG

## 2020-12-01 DIAGNOSIS — I48.0 PAROXYSMAL A-FIB (HCC): Primary | ICD-10-CM

## 2020-12-01 DIAGNOSIS — E78.2 MIXED HYPERLIPIDEMIA: ICD-10-CM

## 2020-12-01 DIAGNOSIS — Z51.81 ENCOUNTER FOR MONITORING SOTALOL THERAPY: ICD-10-CM

## 2020-12-01 DIAGNOSIS — I10 ESSENTIAL HYPERTENSION: ICD-10-CM

## 2020-12-01 DIAGNOSIS — Z79.01 CHRONIC ANTICOAGULATION: ICD-10-CM

## 2020-12-01 DIAGNOSIS — Z79.899 ENCOUNTER FOR MONITORING SOTALOL THERAPY: ICD-10-CM

## 2020-12-01 DIAGNOSIS — E66.09 CLASS 1 OBESITY DUE TO EXCESS CALORIES WITHOUT SERIOUS COMORBIDITY WITH BODY MASS INDEX (BMI) OF 33.0 TO 33.9 IN ADULT: ICD-10-CM

## 2020-12-01 PROCEDURE — 99443 PR PHYS/QHP TELEPHONE EVALUATION 21-30 MIN: CPT | Performed by: INTERNAL MEDICINE

## 2021-05-19 ENCOUNTER — APPOINTMENT (OUTPATIENT)
Dept: GENERAL RADIOLOGY | Facility: HOSPITAL | Age: 71
End: 2021-05-19

## 2021-05-19 ENCOUNTER — HOSPITAL ENCOUNTER (EMERGENCY)
Facility: HOSPITAL | Age: 71
Discharge: HOME | End: 2021-05-19
Attending: EMERGENCY MEDICINE | Admitting: EMERGENCY MEDICINE

## 2021-05-19 VITALS
RESPIRATION RATE: 18 BRPM | DIASTOLIC BLOOD PRESSURE: 76 MMHG | HEART RATE: 80 BPM | WEIGHT: 240 LBS | SYSTOLIC BLOOD PRESSURE: 138 MMHG | HEIGHT: 71 IN | OXYGEN SATURATION: 94 % | BODY MASS INDEX: 33.6 KG/M2 | TEMPERATURE: 97.5 F

## 2021-05-19 DIAGNOSIS — S46.912A STRAIN OF LEFT SHOULDER, INITIAL ENCOUNTER: Primary | ICD-10-CM

## 2021-05-19 PROCEDURE — 73030 X-RAY EXAM OF SHOULDER: CPT | Performed by: RADIOLOGY

## 2021-05-19 PROCEDURE — 25010000002 METHYLPREDNISOLONE PER 125 MG: Performed by: PHYSICIAN ASSISTANT

## 2021-05-19 PROCEDURE — 73030 X-RAY EXAM OF SHOULDER: CPT

## 2021-05-19 PROCEDURE — 99283 EMERGENCY DEPT VISIT LOW MDM: CPT

## 2021-05-19 PROCEDURE — 96372 THER/PROPH/DIAG INJ SC/IM: CPT

## 2021-05-19 RX ORDER — METHYLPREDNISOLONE SODIUM SUCCINATE 125 MG/2ML
80 INJECTION, POWDER, LYOPHILIZED, FOR SOLUTION INTRAMUSCULAR; INTRAVENOUS ONCE
Status: COMPLETED | OUTPATIENT
Start: 2021-05-19 | End: 2021-05-19

## 2021-05-19 RX ORDER — HYDROCODONE BITARTRATE AND ACETAMINOPHEN 7.5; 325 MG/1; MG/1
1 TABLET ORAL ONCE
Status: COMPLETED | OUTPATIENT
Start: 2021-05-19 | End: 2021-05-19

## 2021-05-19 RX ADMIN — HYDROCODONE BITARTRATE AND ACETAMINOPHEN 1 TABLET: 7.5; 325 TABLET ORAL at 21:13

## 2021-05-19 RX ADMIN — METHYLPREDNISOLONE SODIUM SUCCINATE 80 MG: 125 INJECTION, POWDER, FOR SOLUTION INTRAMUSCULAR; INTRAVENOUS at 21:13

## 2021-06-01 ENCOUNTER — OFFICE VISIT (OUTPATIENT)
Dept: CARDIOLOGY | Facility: CLINIC | Age: 71
End: 2021-06-01

## 2021-06-01 VITALS
WEIGHT: 236.6 LBS | DIASTOLIC BLOOD PRESSURE: 76 MMHG | HEIGHT: 71 IN | SYSTOLIC BLOOD PRESSURE: 128 MMHG | HEART RATE: 72 BPM | OXYGEN SATURATION: 97 % | BODY MASS INDEX: 33.12 KG/M2 | TEMPERATURE: 97.4 F

## 2021-06-01 DIAGNOSIS — I48.0 PAROXYSMAL A-FIB (HCC): Primary | ICD-10-CM

## 2021-06-01 DIAGNOSIS — Z91.89 MULTIPLE RISK FACTORS FOR CORONARY ARTERY DISEASE: ICD-10-CM

## 2021-06-01 DIAGNOSIS — Z51.81 ENCOUNTER FOR MONITORING SOTALOL THERAPY: ICD-10-CM

## 2021-06-01 DIAGNOSIS — E78.2 MIXED HYPERLIPIDEMIA: ICD-10-CM

## 2021-06-01 DIAGNOSIS — I10 ESSENTIAL HYPERTENSION: ICD-10-CM

## 2021-06-01 DIAGNOSIS — R07.9 CHEST PAIN IN ADULT: ICD-10-CM

## 2021-06-01 DIAGNOSIS — Z79.01 CHRONIC ANTICOAGULATION: ICD-10-CM

## 2021-06-01 DIAGNOSIS — Z79.899 ENCOUNTER FOR MONITORING SOTALOL THERAPY: ICD-10-CM

## 2021-06-01 DIAGNOSIS — E11.9 CONTROLLED TYPE 2 DIABETES MELLITUS WITHOUT COMPLICATION, WITHOUT LONG-TERM CURRENT USE OF INSULIN (HCC): ICD-10-CM

## 2021-06-01 PROCEDURE — 99214 OFFICE O/P EST MOD 30 MIN: CPT | Performed by: INTERNAL MEDICINE

## 2021-06-01 PROCEDURE — 93000 ELECTROCARDIOGRAM COMPLETE: CPT | Performed by: INTERNAL MEDICINE

## 2021-06-01 NOTE — PROGRESS NOTES
subjective     Chief Complaint   Patient presents with   • Hypertension     6 mon f/u   • Atrial Fibrillation     History of Present Illness  Patient is 70 years old white male who is here for cardiology follow-up.  Patient has history of paroxysmal atrial fibrillation with [id number] 3.  He is on Eliquis and is maintaining sinus rhythm with sotalol.  He denies any palpitations shortness of breath dizziness syncope or presyncope.  Blood pressure is very well controlled with lisinopril.  Patient also is diabetic and is on Metformin.  Will get a copy of lab work from his PCP.  He has been complaining of left shoulder pain and is planning to see an orthopedic surgeon.  He is asymptomatic from cardiac standpoint but has multiple risk factors including  Male gender  Age  Obesity  Hypertension  Diabetes mellitus  Hyperlipidemia    Last stress test and echocardiogram has been 9 years ago.    Past Surgical History:   Procedure Laterality Date   • CARDIOVASCULAR STRESS TEST  2012   • CATARACT EXTRACTION     • ECHO - CONVERTED  2012   • PROSTATECTOMY  08/27/2019    at [place]      Family History   Problem Relation Age of Onset   • No Known Problems Mother    • Lung cancer Father    • Prostate cancer Brother    • Heart disease Sister      Past Medical History:   Diagnosis Date   • Anxiety    • Cancer (CMS/HCC) 10/2018    Prostate cancer   • Elevated PSA    • Encounter for monitoring sotalol therapy    • Hyperlipidemia    • Hypertension    • Obesity    • Paroxysmal A-fib (CMS/HCC)      Patient Active Problem List   Diagnosis   • Paroxysmal a-fib February 2006,currently in sinus rhythm.[id number] 3   • Encounter for monitoring sotalol therapy   • Hypertension   • Hyperlipidemia   • Obesity   • Controlled type 2 diabetes mellitus without complication, without long-term current use of insulin (CMS/HCC)   • Prostate cancer (CMS/HCC)   • Chronic anticoagulation with eliquis   • Multiple risk factors for coronary artery disease        Social History     Tobacco Use   • Smoking status: Former Smoker     Packs/day: 0.50     Years: 3.00     Pack years: 1.50     Types: Cigarettes     Quit date: 2006     Years since quitting: 15.4   • Smokeless tobacco: Former User     Types: Chew   Substance Use Topics   • Alcohol use: No   • Drug use: No       No Known Allergies    Current Outpatient Medications on File Prior to Visit   Medication Sig   • apixaban (ELIQUIS) 5 MG tablet tablet Take 1 tablet by mouth 2 (Two) Times a Day.   • Blood Glucose Monitoring Suppl (TRUE METRIX METER) w/Device kit    • EASY COMFORT PEN NEEDLES 31G X 8 MM misc Daily. as directed   • lisinopril (PRINIVIL,ZESTRIL) 10 MG tablet Take 10 mg by mouth daily.   • LORazepam (ATIVAN) 1 MG tablet    • metFORMIN (GLUCOPHAGE) 500 MG tablet Take 500 mg by mouth 2 (Two) Times a Day With Meals.   • pantoprazole (PROTONIX) 40 MG EC tablet Take 40 mg by mouth Daily.   • sildenafil (REVATIO) 20 MG tablet TAKE ONE TABLET BY MOUTH EVERY NIGHT. BEGIN TAKING EVENING UNGER CATHETER IS REMOVED   • sotalol (BETAPACE) 80 MG tablet Take 80 mg by mouth 2 (two) times a day.   • TRUE METRIX BLOOD GLUCOSE TEST test strip    • TRUEPLUS LANCETS 30G misc      No current facility-administered medications on file prior to visit.         The following portions of the patient's history were reviewed and updated as appropriate: allergies, current medications, past family history, past medical history, past social history, past surgical history and problem list.    Review of Systems   Constitutional: Negative.   HENT: Negative.  Negative for congestion.    Eyes: Negative.    Cardiovascular: Negative.  Negative for chest pain, cyanosis, dyspnea on exertion, irregular heartbeat, leg swelling, near-syncope, orthopnea, palpitations, paroxysmal nocturnal dyspnea and syncope.   Respiratory: Negative.  Negative for shortness of breath.    Hematologic/Lymphatic: Negative.    Musculoskeletal: Negative.   "  Gastrointestinal: Negative.    Neurological: Negative.  Negative for headaches.          Objective:     /76 (BP Location: Left arm, Patient Position: Sitting, Cuff Size: Adult)   Pulse 72   Temp 97.4 °F (36.3 °C) (Infrared)   Ht 180.3 cm (71\")   Wt 107 kg (236 lb 9.6 oz)   SpO2 97%   BMI 33.00 kg/m²   Vitals and nursing note reviewed.   Pulmonary:      Effort: Pulmonary effort is normal.      Breath sounds: Normal breath sounds. No stridor. No wheezing. No rhonchi. No rales.   Cardiovascular:      PMI at left midclavicular line. Normal rate. Regular rhythm. Normal S1. Normal S2.      Murmurs: There is no murmur.      No gallop. No click. No rub.   Pulses:     Intact distal pulses.   Edema:     Peripheral edema absent.           Lab Review    Copy of lab work from PCP requested    ECG 12 Lead    Date/Time: 6/1/2021 10:28 AM  Performed by: Mike Olson MD  Authorized by: Mike Olson MD   Comparison: compared with previous ECG from 6/1/2021  Similar to previous ECG  Rhythm: sinus rhythm  Rate: normal  BPM: 71  Conduction: conduction normal  ST Segments: ST segments normal  T Waves: T waves normal  QRS axis: normal  Other: no other findings    Clinical impression: normal ECG               I personally viewed and interpreted the patient's LAB data         Assessment:     1. Paroxysmal a-fib February 2006,currently in sinus rhythm.[id number] 3    2. Essential hypertension    3. Mixed hyperlipidemia    4. Encounter for monitoring sotalol therapy    5. Controlled type 2 diabetes mellitus without complication, without long-term current use of insulin (CMS/[facility])    6. Chronic anticoagulation with eliquis    7. Multiple risk factors for coronary artery disease    8. Chest pain in adult          Plan:     Patient has history of paroxysmal atrial fibrillation, [id number] 3  He was advised to continue Eliquis 5 mg twice daily.  There is no side effects abnormal bleeding or bruising.    He is " maintaining sinus rhythm  QTC is normal he will continue sotalol 80 twice daily.    Patient has multiple risk factors for coronary artery disease including  Male sex  Age  Obesity  Hypertension  Hyperlipidemia  Diabetes mellitus    Because of sotalol therapy need to know his LV ejection fraction and status of coronary artery disease.  His last stress test was in 2012    He does not have significant chest discomfort but complains of shoulder pain mild chest discomfort.  Will arrange echo and stress test for further evaluation.  Follow-up scheduled  Lab report requested from PCP  Healthy lifestyle emphasized        No follow-ups on file.

## 2021-07-01 ENCOUNTER — HOSPITAL ENCOUNTER (OUTPATIENT)
Dept: NUCLEAR MEDICINE | Facility: HOSPITAL | Age: 71
Discharge: HOME | End: 2021-07-01

## 2021-07-01 ENCOUNTER — HOSPITAL ENCOUNTER (OUTPATIENT)
Dept: CARDIOLOGY | Facility: HOSPITAL | Age: 71
Discharge: HOME | End: 2021-07-01

## 2021-07-01 DIAGNOSIS — R07.9 CHEST PAIN IN ADULT: ICD-10-CM

## 2021-07-01 DIAGNOSIS — I10 ESSENTIAL HYPERTENSION: ICD-10-CM

## 2021-07-01 DIAGNOSIS — Z91.89 MULTIPLE RISK FACTORS FOR CORONARY ARTERY DISEASE: ICD-10-CM

## 2021-07-01 DIAGNOSIS — E11.9 CONTROLLED TYPE 2 DIABETES MELLITUS WITHOUT COMPLICATION, WITHOUT LONG-TERM CURRENT USE OF INSULIN (HCC): ICD-10-CM

## 2021-07-01 DIAGNOSIS — E78.2 MIXED HYPERLIPIDEMIA: ICD-10-CM

## 2021-07-01 LAB
BH CV ECHO MEAS - % IVS THICK: -5.4 %
BH CV ECHO MEAS - % LVPW THICK: -7.7 %
BH CV ECHO MEAS - ACS: 2.4 CM
BH CV ECHO MEAS - AO MAX PG: 5.7 MMHG
BH CV ECHO MEAS - AO MEAN PG: 3 MMHG
BH CV ECHO MEAS - AO ROOT AREA (BSA CORRECTED): 1.5
BH CV ECHO MEAS - AO ROOT AREA: 9.6 CM^2
BH CV ECHO MEAS - AO ROOT DIAM: 3.5 CM
BH CV ECHO MEAS - AO V2 MAX: 119 CM/SEC
BH CV ECHO MEAS - AO V2 MEAN: 78.5 CM/SEC
BH CV ECHO MEAS - AO V2 VTI: 28.3 CM
BH CV ECHO MEAS - BSA(HAYCOCK): 2.3 M^2
BH CV ECHO MEAS - BSA: 2.3 M^2
BH CV ECHO MEAS - BZI_BMI: 32.9 KILOGRAMS/M^2
BH CV ECHO MEAS - BZI_METRIC_HEIGHT: 180.3 CM
BH CV ECHO MEAS - BZI_METRIC_WEIGHT: 107.1 KG
BH CV ECHO MEAS - EDV(CUBED): 135.4 ML
BH CV ECHO MEAS - EDV(MOD-SP4): 122 ML
BH CV ECHO MEAS - EDV(TEICH): 125.8 ML
BH CV ECHO MEAS - EF(CUBED): 66 %
BH CV ECHO MEAS - EF(MOD-SP4): 64.3 %
BH CV ECHO MEAS - EF(TEICH): 57.2 %
BH CV ECHO MEAS - ESV(CUBED): 46.1 ML
BH CV ECHO MEAS - ESV(MOD-SP4): 43.6 ML
BH CV ECHO MEAS - ESV(TEICH): 53.9 ML
BH CV ECHO MEAS - FS: 30.2 %
BH CV ECHO MEAS - IVS/LVPW: 1
BH CV ECHO MEAS - IVSD: 1.1 CM
BH CV ECHO MEAS - IVSS: 1.1 CM
BH CV ECHO MEAS - LA DIMENSION: 3.7 CM
BH CV ECHO MEAS - LA/AO: 1.1
BH CV ECHO MEAS - LV DIASTOLIC VOL/BSA (35-75): 53.9 ML/M^2
BH CV ECHO MEAS - LV MASS(C)D: 219.7 GRAMS
BH CV ECHO MEAS - LV MASS(C)DI: 97.1 GRAMS/M^2
BH CV ECHO MEAS - LV MASS(C)S: 113.5 GRAMS
BH CV ECHO MEAS - LV MASS(C)SI: 50.2 GRAMS/M^2
BH CV ECHO MEAS - LV SYSTOLIC VOL/BSA (12-30): 19.3 ML/M^2
BH CV ECHO MEAS - LVIDD: 5.1 CM
BH CV ECHO MEAS - LVIDS: 3.6 CM
BH CV ECHO MEAS - LVLD AP4: 7.3 CM
BH CV ECHO MEAS - LVLS AP4: 5.4 CM
BH CV ECHO MEAS - LVOT AREA (M): 4.2 CM^2
BH CV ECHO MEAS - LVOT AREA: 4.2 CM^2
BH CV ECHO MEAS - LVOT DIAM: 2.3 CM
BH CV ECHO MEAS - LVPWD: 1.1 CM
BH CV ECHO MEAS - LVPWS: 1 CM
BH CV ECHO MEAS - MV A MAX VEL: 76.3 CM/SEC
BH CV ECHO MEAS - MV E MAX VEL: 49.3 CM/SEC
BH CV ECHO MEAS - MV E/A: 0.65
BH CV ECHO MEAS - PA ACC TIME: 0.1 SEC
BH CV ECHO MEAS - PA PR(ACCEL): 36.3 MMHG
BH CV ECHO MEAS - RAP SYSTOLE: 10 MMHG
BH CV ECHO MEAS - RVSP: 34 MMHG
BH CV ECHO MEAS - SI(AO): 120.3 ML/M^2
BH CV ECHO MEAS - SI(CUBED): 39.5 ML/M^2
BH CV ECHO MEAS - SI(MOD-SP4): 34.7 ML/M^2
BH CV ECHO MEAS - SI(TEICH): 31.8 ML/M^2
BH CV ECHO MEAS - SV(AO): 272.3 ML
BH CV ECHO MEAS - SV(CUBED): 89.3 ML
BH CV ECHO MEAS - SV(MOD-SP4): 78.4 ML
BH CV ECHO MEAS - SV(TEICH): 71.9 ML
BH CV ECHO MEAS - TR MAX VEL: 259 CM/SEC
BH CV NUCLEAR PRIOR STUDY: 3
BH CV REST NUCLEAR ISOTOPE DOSE: 9.8 MCI
BH CV STRESS BP STAGE 1: NORMAL
BH CV STRESS BP STAGE 2: NORMAL
BH CV STRESS DURATION MIN STAGE 1: 3
BH CV STRESS DURATION MIN STAGE 2: 3
BH CV STRESS DURATION MIN STAGE 3: 0
BH CV STRESS DURATION SEC STAGE 1: 0
BH CV STRESS DURATION SEC STAGE 2: 0
BH CV STRESS DURATION SEC STAGE 3: 24
BH CV STRESS GRADE STAGE 1: 10
BH CV STRESS GRADE STAGE 2: 12
BH CV STRESS GRADE STAGE 3: 14
BH CV STRESS HR STAGE 1: 106
BH CV STRESS HR STAGE 2: 119
BH CV STRESS HR STAGE 3: 129
BH CV STRESS METS STAGE 1: 5
BH CV STRESS METS STAGE 2: 7.5
BH CV STRESS METS STAGE 3: 10
BH CV STRESS NUCLEAR ISOTOPE DOSE: 30.1 MCI
BH CV STRESS PROTOCOL 1: NORMAL
BH CV STRESS RECOVERY BP: NORMAL MMHG
BH CV STRESS RECOVERY HR: 88 BPM
BH CV STRESS SPEED STAGE 1: 1.7
BH CV STRESS SPEED STAGE 2: 2.5
BH CV STRESS SPEED STAGE 3: 3.4
BH CV STRESS STAGE 1: 1
BH CV STRESS STAGE 2: 2
BH CV STRESS STAGE 3: 3
LV EF NUC BP: 63 %
MAXIMAL PREDICTED HEART RATE: 148 BPM
PERCENT MAX PREDICTED HR: 87.16 %
STRESS BASELINE BP: NORMAL MMHG
STRESS BASELINE HR: 78 BPM
STRESS PERCENT HR: 103 %
STRESS POST ESTIMATED WORKLOAD: 7 METS
STRESS POST EXERCISE DUR MIN: 6 MIN
STRESS POST EXERCISE DUR SEC: 24 SEC
STRESS POST PEAK BP: NORMAL MMHG
STRESS POST PEAK HR: 129 BPM
STRESS TARGET HR: 126 BPM

## 2021-07-01 PROCEDURE — 93017 CV STRESS TEST TRACING ONLY: CPT

## 2021-07-01 PROCEDURE — 93306 TTE W/DOPPLER COMPLETE: CPT

## 2021-07-01 PROCEDURE — 0 TECHNETIUM SESTAMIBI: Performed by: INTERNAL MEDICINE

## 2021-07-01 PROCEDURE — A9500 TC99M SESTAMIBI: HCPCS | Performed by: INTERNAL MEDICINE

## 2021-07-01 PROCEDURE — 78452 HT MUSCLE IMAGE SPECT MULT: CPT | Performed by: INTERNAL MEDICINE

## 2021-07-01 PROCEDURE — 93018 CV STRESS TEST I&R ONLY: CPT | Performed by: INTERNAL MEDICINE

## 2021-07-01 PROCEDURE — 78452 HT MUSCLE IMAGE SPECT MULT: CPT

## 2021-07-01 PROCEDURE — 93306 TTE W/DOPPLER COMPLETE: CPT | Performed by: INTERNAL MEDICINE

## 2021-07-01 RX ADMIN — TECHNETIUM TC 99M SESTAMIBI 1 DOSE: 1 INJECTION INTRAVENOUS at 09:05

## 2021-07-01 RX ADMIN — TECHNETIUM TC 99M SESTAMIBI 1 DOSE: 1 INJECTION INTRAVENOUS at 10:30

## 2021-07-02 ENCOUNTER — TELEPHONE (OUTPATIENT)
Dept: CARDIOLOGY | Facility: CLINIC | Age: 71
End: 2021-07-02

## 2021-07-02 NOTE — TELEPHONE ENCOUNTER
----- Message from Mike Olson MD sent at 7/1/2021  6:06 PM EDT -----  Echocardiogram is okayStress test does not show any significant abnormalityYou could not completely finish the treadmill test.  There is very small area which may have mild ischemia but overall I feel the stress test is good.  Nothing needs to be changed at this time.  Continue current medications, sugar and cholesterol control and other risk factor modifications.  But no change in therapy at this time

## 2021-12-01 ENCOUNTER — OFFICE VISIT (OUTPATIENT)
Dept: CARDIOLOGY | Facility: CLINIC | Age: 71
End: 2021-12-01

## 2021-12-01 VITALS
BODY MASS INDEX: 33.88 KG/M2 | TEMPERATURE: 97.8 F | HEIGHT: 71 IN | HEART RATE: 84 BPM | SYSTOLIC BLOOD PRESSURE: 126 MMHG | WEIGHT: 242 LBS | OXYGEN SATURATION: 95 % | DIASTOLIC BLOOD PRESSURE: 70 MMHG

## 2021-12-01 DIAGNOSIS — Z79.899 ENCOUNTER FOR MONITORING SOTALOL THERAPY: ICD-10-CM

## 2021-12-01 DIAGNOSIS — I48.0 PAROXYSMAL A-FIB (HCC): Primary | ICD-10-CM

## 2021-12-01 DIAGNOSIS — Z51.81 ENCOUNTER FOR MONITORING SOTALOL THERAPY: ICD-10-CM

## 2021-12-01 DIAGNOSIS — I10 PRIMARY HYPERTENSION: ICD-10-CM

## 2021-12-01 DIAGNOSIS — Z79.01 CHRONIC ANTICOAGULATION: ICD-10-CM

## 2021-12-01 DIAGNOSIS — E66.09 CLASS 1 OBESITY DUE TO EXCESS CALORIES WITHOUT SERIOUS COMORBIDITY WITH BODY MASS INDEX (BMI) OF 33.0 TO 33.9 IN ADULT: ICD-10-CM

## 2021-12-01 DIAGNOSIS — Z91.89 MULTIPLE RISK FACTORS FOR CORONARY ARTERY DISEASE: ICD-10-CM

## 2021-12-01 PROCEDURE — 99214 OFFICE O/P EST MOD 30 MIN: CPT | Performed by: INTERNAL MEDICINE

## 2021-12-01 RX ORDER — FOLIC ACID 1 MG/1
1000 TABLET ORAL DAILY
COMMUNITY
Start: 2021-11-18

## 2021-12-01 RX ORDER — TRAMADOL HYDROCHLORIDE 50 MG/1
50 TABLET ORAL AS NEEDED
COMMUNITY
Start: 2021-11-17

## 2021-12-01 RX ORDER — SERTRALINE HYDROCHLORIDE 100 MG/1
100 TABLET, FILM COATED ORAL DAILY
COMMUNITY
Start: 2021-10-21

## 2021-12-01 RX ORDER — INSULIN DEGLUDEC INJECTION 100 U/ML
44 INJECTION, SOLUTION SUBCUTANEOUS DAILY
COMMUNITY
Start: 2021-11-10

## 2021-12-01 RX ORDER — FENOFIBRATE 145 MG/1
145 TABLET, COATED ORAL DAILY
COMMUNITY
Start: 2021-11-18

## 2021-12-01 NOTE — PROGRESS NOTES
subjective     Chief Complaint   Patient presents with   • Hypertension     follow up   • Atrial Fibrillation     follow up     History of Present Illness    Patient is 71 years old white male who is here for cardiology follow-up.  Patient has history of paroxysmal atrial fibrillation.  He is currently maintaining sinus rhythm with Betapace therapy and is anticoagulated with Eliquis with [id number] 3.  He denies any palpitations.  No dizziness syncope or presyncope.    He is on anticoagulation with Eliquis denies any abnormal bleeding or bruising.      No chest pain or shortness of breath.    Blood pressure is very well controlled with lisinopril.  Patient also has hyperlipidemia and has been taking TriCor but no statin therapy  Patient is diabetic on Metformin following closely with the his PCP CHINO Xie      Past Surgical History:   Procedure Laterality Date   • CARDIOVASCULAR STRESS TEST  2012   • CATARACT EXTRACTION     • ECHO - CONVERTED  2012   • PROSTATECTOMY  08/27/2019    at [place]      Family History   Problem Relation Age of Onset   • No Known Problems Mother    • Lung cancer Father    • Prostate cancer Brother    • Heart disease Sister      Past Medical History:   Diagnosis Date   • Anxiety    • Cancer (HCC) 10/2018    Prostate cancer   • Elevated PSA    • Encounter for monitoring sotalol therapy    • Hyperlipidemia    • Hypertension    • Obesity    • Paroxysmal A-fib (HCC)      Patient Active Problem List   Diagnosis   • Paroxysmal a-fib February 2006,currently in sinus rhythm.[id number] 3   • Encounter for monitoring sotalol therapy   • Hypertension   • Hyperlipidemia   • Obesity   • Controlled type 2 diabetes mellitus without complication, without long-term current use of insulin (HCC)   • Prostate cancer (HCC)   • Chronic anticoagulation with eliquis   • Multiple risk factors for coronary artery disease       Social History     Tobacco Use   • Smoking status: Former Smoker      Packs/day: 0.50     Years: 3.00     Pack years: 1.50     Types: Cigarettes     Quit date: 2006     Years since quitting: 15.9   • Smokeless tobacco: Former User     Types: Chew   Substance Use Topics   • Alcohol use: No   • Drug use: No       No Known Allergies    Current Outpatient Medications on File Prior to Visit   Medication Sig   • apixaban (ELIQUIS) 5 MG tablet tablet Take 1 tablet by mouth 2 (Two) Times a Day.   • Blood Glucose Monitoring Suppl (TRUE METRIX METER) w/Device kit    • EASY COMFORT PEN NEEDLES 31G X 8 MM misc Daily. as directed   • fenofibrate (TRICOR) 145 MG tablet Take 145 mg by mouth Daily.   • folic acid (FOLVITE) 1 MG tablet Take 1,000 mcg by mouth Daily.   • lisinopril (PRINIVIL,ZESTRIL) 10 MG tablet Take 10 mg by mouth daily.   • metFORMIN (GLUCOPHAGE) 500 MG tablet Take 500 mg by mouth 2 (Two) Times a Day With Meals.   • pantoprazole (PROTONIX) 40 MG EC tablet Take 40 mg by mouth Daily.   • sertraline (ZOLOFT) 100 MG tablet Take 100 mg by mouth Daily.   • sotalol (BETAPACE) 80 MG tablet Take 80 mg by mouth 2 (two) times a day.   • traMADol (ULTRAM) 50 MG tablet Take 50 mg by mouth As Needed.   • Tresiba FlexTouch 100 UNIT/ML solution pen-injector injection Inject 44 Units under the skin into the appropriate area as directed Daily.   • TRUE METRIX BLOOD GLUCOSE TEST test strip    • TRUEPLUS LANCETS 30G misc    • [DISCONTINUED] LORazepam (ATIVAN) 1 MG tablet    • [DISCONTINUED] sildenafil (REVATIO) 20 MG tablet TAKE ONE TABLET BY MOUTH EVERY NIGHT. BEGIN TAKING EVENING UNGER CATHETER IS REMOVED     No current facility-administered medications on file prior to visit.         The following portions of the patient's history were reviewed and updated as appropriate: allergies, current medications, past family history, past medical history, past social history, past surgical history and problem list.    Review of Systems   Constitutional: Negative.   HENT: Negative.  Negative for congestion.   "  Eyes: Negative.    Cardiovascular: Negative.  Negative for chest pain, cyanosis, dyspnea on exertion, irregular heartbeat, leg swelling, near-syncope, orthopnea, palpitations, paroxysmal nocturnal dyspnea and syncope.   Respiratory: Negative.  Negative for shortness of breath.    Hematologic/Lymphatic: Negative.    Musculoskeletal: Negative.    Gastrointestinal: Negative.    Neurological: Negative.  Negative for headaches.          Objective:     /70 (BP Location: Left arm, Patient Position: Sitting, Cuff Size: Adult)   Pulse 84   Temp 97.8 °F (36.6 °C)   Ht 180.3 cm (71\")   Wt 110 kg (242 lb)   SpO2 95%   BMI 33.75 kg/m²   Pulmonary:      Effort: Pulmonary effort is normal.      Breath sounds: Normal breath sounds. No stridor. No wheezing. No rhonchi. No rales.   Cardiovascular:      PMI at left midclavicular line. Normal rate. Regular rhythm. Normal S1. Normal S2.      Murmurs: There is no murmur.      No gallop. No click. No rub.   Pulses:     Intact distal pulses.   Edema:     Peripheral edema absent.           Lab Review    Copy of lab work requested from PCP     last A1c was 8.7 11 months ago it total cholesterol was 161 with a triglyceride 173 and LDL 84    Procedures       I personally viewed and interpreted the patient's LAB data         Assessment:     1. Paroxysmal a-fib February 2006,currently in sinus rhythm.[id number] 3    2. Encounter for monitoring sotalol therapy    3. Class 1 obesity due to excess calories without serious comorbidity with body mass index (BMI) of 33.0 to 33.9 in adult    4. Primary hypertension    5. Chronic anticoagulation with eliquis    6. Multiple risk factors for coronary artery disease          Plan:     Patient has paroxysmal atrial fibrillation with [id number] 3.  He was advised to continue sotalol 80 twice daily.  He is maintaining sinus rhythm.  Anticoagulation with Eliquis was continued.  There are no drug side effects.      Patient has multiple risk " factors for coronary artery disease.  Underwent stress test and echocardiogram on 7/1/2021.  Stress test was negative for significant exercise-induced myocardial ischemia.  Echocardiogram was normal with grade 1 LV diastolic dysfunction.    Healthy lifestyle and weight loss emphasized.  No change in therapy was made.  Patient is diabetic and should be on statin therapy.    Aggressive risk factor modification again discussed.  Follow-up in 6 months        No follow-ups on file.

## 2022-06-01 ENCOUNTER — OFFICE VISIT (OUTPATIENT)
Dept: CARDIOLOGY | Facility: CLINIC | Age: 72
End: 2022-06-01

## 2022-06-01 VITALS
HEART RATE: 77 BPM | BODY MASS INDEX: 34.02 KG/M2 | HEIGHT: 71 IN | WEIGHT: 243 LBS | OXYGEN SATURATION: 97 % | DIASTOLIC BLOOD PRESSURE: 64 MMHG | TEMPERATURE: 96.9 F | SYSTOLIC BLOOD PRESSURE: 112 MMHG

## 2022-06-01 DIAGNOSIS — I48.19 PERSISTENT ATRIAL FIBRILLATION: Primary | ICD-10-CM

## 2022-06-01 DIAGNOSIS — Z91.89 MULTIPLE RISK FACTORS FOR CORONARY ARTERY DISEASE: ICD-10-CM

## 2022-06-01 DIAGNOSIS — I10 PRIMARY HYPERTENSION: ICD-10-CM

## 2022-06-01 DIAGNOSIS — Z79.01 CHRONIC ANTICOAGULATION: ICD-10-CM

## 2022-06-01 PROCEDURE — 99214 OFFICE O/P EST MOD 30 MIN: CPT | Performed by: INTERNAL MEDICINE

## 2022-06-01 RX ORDER — METHOTREXATE 2.5 MG/1
TABLET ORAL
COMMUNITY
Start: 2022-05-02

## 2022-06-01 NOTE — PROGRESS NOTES
subjective     Chief Complaint   Patient presents with   • Atrial Fibrillation     Follow up     History of Present Illness    Miki is 71 years old white male who has history of persistent atrial fibrillation, he is taking Betapace 80 mg twice daily and is maintaining sinus rhythm.  Is anticoagulated with Eliquis 5 mg twice daily without any drug side effects.  Hypertension is well controlled with lisinopril.  Patient also has diabetes mellitus type 2 and hyperlipidemia.  He denies any chest pain palpitations or shortness of breath.    Past Surgical History:   Procedure Laterality Date   • CARDIOVASCULAR STRESS TEST     • CATARACT EXTRACTION     • ECHO - CONVERTED     • PROSTATECTOMY  2019    at [place]      Family History   Problem Relation Age of Onset   • No Known Problems Mother    • Lung cancer Father    • Prostate cancer Brother    • Heart disease Sister      Past Medical History:   Diagnosis Date   • Anxiety    • Cancer (HCC) 10/2018    Prostate cancer   • Elevated PSA    • Encounter for monitoring sotalol therapy    • Hyperlipidemia    • Hypertension    • Obesity    • Paroxysmal A-fib (HCC)      Patient Active Problem List   Diagnosis   • Persistent atrial fibrillation (HCC)   • Encounter for monitoring sotalol therapy   • Hypertension   • Hyperlipidemia   • Obesity   • Controlled type 2 diabetes mellitus without complication, without long-term current use of insulin (HCC)   • Prostate cancer ([facility])   • Chronic anticoagulation with eliquis   • Multiple risk factors for coronary artery disease       Social History     Tobacco Use   • Smoking status: Former Smoker     Packs/day: 0.50     Years: 3.00     Pack years: 1.50     Types: Cigarettes     Quit date:      Years since quittin.4   • Smokeless tobacco: Former User     Types: Chew   Vaping Use   • Vaping Use: Never used   Substance Use Topics   • Alcohol use: No   • Drug use: No       No Known Allergies    Current Outpatient  Medications on File Prior to Visit   Medication Sig   • apixaban (ELIQUIS) 5 MG tablet tablet Take 1 tablet by mouth 2 (Two) Times a Day.   • Blood Glucose Monitoring Suppl (TRUE METRIX METER) w/Device kit    • EASY COMFORT PEN NEEDLES 31G X 8 MM misc Daily. as directed   • fenofibrate (TRICOR) 145 MG tablet Take 145 mg by mouth Daily.   • folic acid (FOLVITE) 1 MG tablet Take 1,000 mcg by mouth Daily.   • lisinopril (PRINIVIL,ZESTRIL) 10 MG tablet Take 10 mg by mouth daily.   • metFORMIN (GLUCOPHAGE) 500 MG tablet Take 500 mg by mouth 2 (Two) Times a Day With Meals.   • methotrexate 2.5 MG tablet Take 4 tablets every Friday   • pantoprazole (PROTONIX) 40 MG EC tablet Take 40 mg by mouth Daily.   • sertraline (ZOLOFT) 100 MG tablet Take 100 mg by mouth Daily.   • sotalol (BETAPACE) 80 MG tablet Take 80 mg by mouth 2 (two) times a day.   • traMADol (ULTRAM) 50 MG tablet Take 50 mg by mouth As Needed.   • Tresiba FlexTouch 100 UNIT/ML solution pen-injector injection Inject 44 Units under the skin into the appropriate area as directed Daily.   • TRUE METRIX BLOOD GLUCOSE TEST test strip    • TRUEPLUS LANCETS 30G misc      No current facility-administered medications on file prior to visit.         The following portions of the patient's history were reviewed and updated as appropriate: allergies, current medications, past family history, past medical history, past social history, past surgical history and problem list.    Review of Systems   Constitutional: Negative.   HENT: Negative.  Negative for congestion.    Eyes: Negative.    Cardiovascular: Negative.  Negative for chest pain, cyanosis, dyspnea on exertion, irregular heartbeat, leg swelling, near-syncope, orthopnea, palpitations, paroxysmal nocturnal dyspnea and syncope.   Respiratory: Negative.  Negative for shortness of breath.    Hematologic/Lymphatic: Negative.    Musculoskeletal: Negative.    Gastrointestinal: Negative.    Neurological: Negative.  Negative  "for headaches.          Objective:     /64 (BP Location: Left arm, Patient Position: Sitting, Cuff Size: Adult)   Pulse 77   Temp 96.9 °F (36.1 °C)   Ht 180.3 cm (71\")   Wt 110 kg (243 lb)   SpO2 97%   BMI 33.89 kg/m²   Cardiovascular:      PMI at left midclavicular line. Normal rate. Regular rhythm. Normal S1. Normal S2.      Murmurs: There is no murmur.      No gallop. No click. No rub.   Pulses:     Intact distal pulses.   Edema:     Peripheral edema absent.           Lab Review    No recent lab work available    Procedures       I personally viewed and interpreted the patient's LAB data         Assessment:     1. Persistent atrial fibrillation (HCC)    2. Primary hypertension    3. Chronic anticoagulation with eliquis    4. Multiple risk factors for coronary artery disease          Plan:     Patient is 71 years old white male with persistent atrial fibrillation currently in sinus rhythm on sotalol therapy which will be continued.  No drug side effects noted.  Eliquis 5 twice daily was continued.  No abnormal bleeding or bruising.    Primary hypertension which is controlled with lisinopril.  Patient has multiple risk factors for coronary artery disease, aggressive risk factor modification emphasized.    Because of sotalol therapy need to know patient's electrolytes and renal functions.  Similarly for Eliquis renal functions are needed.  Will request copy of lab work from PCP.  Follow-up scheduled        No follow-ups on file.  "

## 2022-11-30 ENCOUNTER — OFFICE VISIT (OUTPATIENT)
Dept: CARDIOLOGY | Facility: CLINIC | Age: 72
End: 2022-11-30

## 2022-11-30 VITALS
SYSTOLIC BLOOD PRESSURE: 122 MMHG | BODY MASS INDEX: 33.88 KG/M2 | HEIGHT: 71 IN | OXYGEN SATURATION: 96 % | WEIGHT: 242 LBS | DIASTOLIC BLOOD PRESSURE: 68 MMHG | HEART RATE: 74 BPM

## 2022-11-30 DIAGNOSIS — E66.09 CLASS 1 OBESITY DUE TO EXCESS CALORIES WITHOUT SERIOUS COMORBIDITY WITH BODY MASS INDEX (BMI) OF 33.0 TO 33.9 IN ADULT: ICD-10-CM

## 2022-11-30 DIAGNOSIS — I48.19 PERSISTENT ATRIAL FIBRILLATION: Primary | ICD-10-CM

## 2022-11-30 DIAGNOSIS — Z79.899 ENCOUNTER FOR MONITORING SOTALOL THERAPY: ICD-10-CM

## 2022-11-30 DIAGNOSIS — Z91.89 MULTIPLE RISK FACTORS FOR CORONARY ARTERY DISEASE: ICD-10-CM

## 2022-11-30 DIAGNOSIS — Z51.81 ENCOUNTER FOR MONITORING SOTALOL THERAPY: ICD-10-CM

## 2022-11-30 DIAGNOSIS — Z79.01 CHRONIC ANTICOAGULATION: ICD-10-CM

## 2022-11-30 PROCEDURE — 93000 ELECTROCARDIOGRAM COMPLETE: CPT | Performed by: INTERNAL MEDICINE

## 2022-11-30 PROCEDURE — 99214 OFFICE O/P EST MOD 30 MIN: CPT | Performed by: INTERNAL MEDICINE

## 2022-11-30 RX ORDER — LISINOPRIL 5 MG/1
5 TABLET ORAL DAILY
COMMUNITY
Start: 2022-09-14

## 2022-11-30 RX ORDER — LORAZEPAM 1 MG/1
TABLET ORAL
COMMUNITY
Start: 2022-09-14

## 2023-05-11 ENCOUNTER — OFFICE VISIT (OUTPATIENT)
Dept: UROLOGY | Facility: CLINIC | Age: 73
End: 2023-05-11
Payer: MEDICARE

## 2023-05-11 VITALS
HEART RATE: 84 BPM | HEIGHT: 71 IN | DIASTOLIC BLOOD PRESSURE: 77 MMHG | BODY MASS INDEX: 33.6 KG/M2 | WEIGHT: 240 LBS | SYSTOLIC BLOOD PRESSURE: 120 MMHG

## 2023-05-11 DIAGNOSIS — R79.89 ABNORMAL CBC: ICD-10-CM

## 2023-05-11 DIAGNOSIS — N47.1 PHIMOSIS: Primary | ICD-10-CM

## 2023-05-11 RX ORDER — CLOTRIMAZOLE AND BETAMETHASONE DIPROPIONATE 10; .64 MG/G; MG/G
1 CREAM TOPICAL 2 TIMES DAILY
Qty: 45 G | Refills: 2 | Status: SHIPPED | OUTPATIENT
Start: 2023-05-11

## 2023-05-11 RX ORDER — GENTAMICIN SULFATE 80 MG/100ML
80 INJECTION, SOLUTION INTRAVENOUS ONCE
OUTPATIENT
Start: 2023-05-11 | End: 2023-05-11

## 2023-05-11 NOTE — PROGRESS NOTES
"Chief Complaint:    Chief Complaint   Patient presents with   • Phimosis        Vital Signs:   /77 (BP Location: Right arm, Patient Position: Sitting)   Pulse 84   Ht 180.3 cm (70.98\")   Wt 109 kg (240 lb)   BMI 33.49 kg/m²   Body mass index is 33.49 kg/m².      HPI:  Miki Jett is a 72 y.o. male who presents today for initial evaluation     History of Present Illness  Mr. Jett presents to the clinic for evaluation of phimosis.  He was last seen in office by Dr. Wilburn in 2018 where he was diagnosed with prostate carcinoma.  He underwent a radical prostatectomy at that time and reports a normal PSA since.  States that he has had difficulty retracting the foreskin for the past year.  Reports that it is gotten even tighter more recently and he is unable to retract it at all.  He reports a very narrow opening to help with urination.  He is able to still urinate sitting down.  On physical exam today he has a severe phimosis with inability to retract foreskin.  This time I am recommending an urgent elective circumcision in the OR setting.  I will give the patient Lotrisone cream to apply twice daily to help with balanitis and inflammation.      Past Medical History:  Past Medical History:   Diagnosis Date   • Anxiety    • Cancer 10/2018    Prostate cancer   • Elevated PSA    • Encounter for monitoring sotalol therapy    • Hyperlipidemia    • Hypertension    • Obesity    • Paroxysmal A-fib        Current Meds:  Current Outpatient Medications   Medication Sig Dispense Refill   • apixaban (ELIQUIS) 5 MG tablet tablet Take 1 tablet by mouth 2 (Two) Times a Day. 60 tablet 2   • Blood Glucose Monitoring Suppl (TRUE METRIX METER) w/Device kit      • EASY COMFORT PEN NEEDLES 31G X 8 MM misc Daily. as directed  0   • folic acid (FOLVITE) 1 MG tablet Take 1 tablet by mouth Daily.     • lisinopril (PRINIVIL,ZESTRIL) 5 MG tablet Take 1 tablet by mouth Daily. for blood pressure     • LORazepam (ATIVAN) 1 MG " tablet TAKE ONE TABLET BY MOUTH EVERY DAY AS NEEDED ANXIETY AND OR SLEEP     • metFORMIN (GLUCOPHAGE) 500 MG tablet Take 1 tablet by mouth 2 (Two) Times a Day With Meals.     • methotrexate 2.5 MG tablet Take 4 tablets every Friday     • sertraline (ZOLOFT) 100 MG tablet Take 1 tablet by mouth Daily.     • sotalol (BETAPACE) 80 MG tablet Take 1 tablet by mouth 2 (Two) Times a Day.     • Tresiba FlexTouch 100 UNIT/ML solution pen-injector injection Inject 44 Units under the skin into the appropriate area as directed Daily.     • TRUE METRIX BLOOD GLUCOSE TEST test strip      • TRUEPLUS LANCETS 30G misc      • clotrimazole-betamethasone (Lotrisone) 1-0.05 % cream Apply 1 application topically to the appropriate area as directed 2 (Two) Times a Day. 45 g 2   • fenofibrate (TRICOR) 145 MG tablet Take 145 mg by mouth Daily. (Patient not taking: Reported on 2023)     • pantoprazole (PROTONIX) 40 MG EC tablet Take 40 mg by mouth Daily. (Patient not taking: Reported on 2023)  3   • traMADol (ULTRAM) 50 MG tablet Take 50 mg by mouth As Needed. (Patient not taking: Reported on 2023)       No current facility-administered medications for this visit.        Allergies:   No Known Allergies     Past Surgical History:  Past Surgical History:   Procedure Laterality Date   • CARDIOVASCULAR STRESS TEST     • CATARACT EXTRACTION     • ECHO - CONVERTED     • PROSTATECTOMY  2019    at [place]        Social History:  Social History     Socioeconomic History   • Marital status:    Tobacco Use   • Smoking status: Former     Packs/day: 0.50     Years: 3.00     Pack years: 1.50     Types: Cigarettes     Quit date:      Years since quittin.3   • Smokeless tobacco: Former     Types: Chew   Vaping Use   • Vaping Use: Never used   Substance and Sexual Activity   • Alcohol use: No   • Drug use: No   • Sexual activity: Defer       Family History:  Family History   Problem Relation Age of Onset   • No  Known Problems Mother    • Lung cancer Father    • Prostate cancer Brother    • Heart disease Sister        Review of Systems:  Review of Systems   Constitutional: Negative for chills, fatigue and fever.   Respiratory: Negative for cough, shortness of breath and wheezing.    Gastrointestinal: Negative for abdominal pain, nausea and vomiting.   Genitourinary: Positive for difficulty urinating. Negative for dysuria, frequency, penile pain, scrotal swelling, testicular pain and urgency.   Musculoskeletal: Negative for back pain and joint swelling.   Neurological: Negative for dizziness and headaches.   Psychiatric/Behavioral: Negative for confusion.       Physical Exam:  Physical Exam  Constitutional:       General: He is not in acute distress.     Appearance: Normal appearance.   HENT:      Head: Normocephalic and atraumatic.      Nose: Nose normal.      Mouth/Throat:      Mouth: Mucous membranes are moist.   Eyes:      Conjunctiva/sclera: Conjunctivae normal.   Cardiovascular:      Rate and Rhythm: Normal rate and regular rhythm.      Pulses: Normal pulses.      Heart sounds: Normal heart sounds.   Pulmonary:      Effort: Pulmonary effort is normal.      Breath sounds: Normal breath sounds.   Abdominal:      General: Bowel sounds are normal.      Palpations: Abdomen is soft.   Genitourinary:     Comments: Phimosis  Musculoskeletal:         General: Normal range of motion.      Cervical back: Normal range of motion.   Skin:     General: Skin is warm.   Neurological:      General: No focal deficit present.      Mental Status: He is alert and oriented to person, place, and time.   Psychiatric:         Mood and Affect: Mood normal.         Behavior: Behavior normal.         Thought Content: Thought content normal.         Judgment: Judgment normal.                    Recent Image (CT and/or KUB):   CT Abdomen and Pelvis: No results found for this or any previous visit.     CT Stone Protocol: No results found for this or  any previous visit.     KUB: No results found for this or any previous visit.       Labs:  Brief Urine Lab Results     None        No visits with results within 3 Month(s) from this visit.   Latest known visit with results is:   Hospital Outpatient Visit on 07/01/2021   Component Date Value Ref Range Status   • Target HR (85%) 07/01/2021 126  bpm Final   • Max. Pred. HR (100%) 07/01/2021 148  bpm Final   • BH CV REST NUCLEAR ISOTOPE DOSE 07/01/2021 9.8  mCi Final   • Nuclear Prior Study 07/01/2021 3   Final   • Exercise duration (sec) 07/01/2021 24  sec Final   • Exercise duration (min) 07/01/2021 6  min Final   • Estimated workload 07/01/2021 7.0  METS Final   • BH CV STRESS NUCLEAR ISOTOPE DOSE 07/01/2021 30.1  mCi Final   • BH CV STRESS PROTOCOL 1 07/01/2021 Segun   Final   • Stage 1 07/01/2021 1   Final   • HR Stage 1 07/01/2021 106   Final   • BP Stage 1 07/01/2021 114/60   Final   • Duration Min Stage 1 07/01/2021 3   Final   • Duration Sec Stage 1 07/01/2021 0   Final   • Grade Stage 1 07/01/2021 10   Final   • Speed Stage 1 07/01/2021 1.7   Final   • BH CV STRESS METS STAGE 1 07/01/2021 5   Final   • Stage 2 07/01/2021 2   Final   • HR Stage 2 07/01/2021 119   Final   • BP Stage 2 07/01/2021 150/93   Final   • Duration Min Stage 2 07/01/2021 3   Final   • Duration Sec Stage 2 07/01/2021 0   Final   • Grade Stage 2 07/01/2021 12   Final   • Speed Stage 2 07/01/2021 2.5   Final   • BH CV STRESS METS STAGE 2 07/01/2021 7.5   Final   • Stage 3 07/01/2021 3   Final   • HR Stage 3 07/01/2021 129   Final   • Duration Min Stage 3 07/01/2021 0   Final   • Duration Sec Stage 3 07/01/2021 24   Final   • Grade Stage 3 07/01/2021 14   Final   • Speed Stage 3 07/01/2021 3.4   Final   • BH CV STRESS METS STAGE 3 07/01/2021 10.0   Final   • Baseline HR 07/01/2021 78  bpm Final   • Baseline BP 07/01/2021 150/89  mmHg Final   • Peak HR 07/01/2021 129  bpm Final   • Percent Max Pred HR 07/01/2021 87.16  % Final   • Percent  Target HR 07/01/2021 103  % Final   • Peak BP 07/01/2021 150/93  mmHg Final   • Recovery HR 07/01/2021 88  bpm Final   • Recovery BP 07/01/2021 112/60  mmHg Final   • Nuc Stress EF 07/01/2021 63  % Final        Procedure: None  Procedures      I have reviewed and agree with the above PMH, PSH, FMH, social history, medications, allergies, and labs.     Assessment/Plan:   Problem List Items Addressed This Visit        Genitourinary and Reproductive     Phimosis - Primary    Overview     Added automatically from request for surgery [id number]         Relevant Medications    clotrimazole-betamethasone (Lotrisone) 1-0.05 % cream    Other Relevant Orders    Case Request (Completed)    CBC (No Diff)    Basic Metabolic Panel   Other Visit Diagnoses     Abnormal CBC              Health Maintenance:   Health Maintenance Due   Topic Date Due   • URINE MICROALBUMIN  Never done   • COLORECTAL CANCER SCREENING  Never done   • COVID-19 Vaccine (1) Never done   • Pneumococcal Vaccine 65+ (1 - PCV) Never done   • TDAP/TD VACCINES (1 - Tdap) Never done   • ZOSTER VACCINE (1 of 2) Never done   • HEPATITIS C SCREENING  Never done   • ANNUAL WELLNESS VISIT  Never done   • DIABETIC FOOT EXAM  Never done   • LIPID PANEL  06/16/2017   • HEMOGLOBIN A1C  Never done   • DIABETIC EYE EXAM  Never done        Smoking Counseling: Former smoker.  Former user of smokeless tobacco.    Urine Incontinence: Patient reports that he is experiencing symptoms of urinary incontinence that has occurred since radical prostatectomy.    Patient was given instructions and counseling regarding his condition or for health maintenance advice. Please see specific information pulled into the AVS if appropriate.    Patient Education:   Phimosis -discussed with the patient the pathophysiology of phimosis.  Discussed treatment which can include medications versus surgery.  Did discuss with the patient the use of a dorsal slit versus circumcision.  Patient would like to try  to wait on circumcision.  Discussed this procedure in detail with the patient including risks and benefits.  Patient is currently on Eliquis daily and he will need cardiac clearance from Dr. Olson before he can undergo an elective circumcision in the OR setting.  We will schedule him tentatively for 5/22/2023 with Dr. Hung.  I will send in Lotrisone cream for the patient to apply twice daily to the affected area to help with inflammation and swelling.  Advised the patient to return to the clinic or go the nearest ER if he is unable to urinate or begins to experience severe penile pain.  Patient verbalizes understanding and agrees to plan of care.    Visit Diagnoses:    ICD-10-CM ICD-9-CM   1. Phimosis  N47.1 605   2. Abnormal CBC  R79.89 790.6       Meds Ordered During Visit:  New Medications Ordered This Visit   Medications   • clotrimazole-betamethasone (Lotrisone) 1-0.05 % cream     Sig: Apply 1 application topically to the appropriate area as directed 2 (Two) Times a Day.     Dispense:  45 g     Refill:  2       Follow Up Appointment: Elective circumcision on 5/22/2023  No follow-ups on file.      This document has been electronically signed by Chun Kimball PA-C   May 11, 2023 14:16 EDT    Part of this note may be an electronic transcription/translation of spoken language to printed text using the Dragon Dictation System.

## 2023-05-11 NOTE — H&P (VIEW-ONLY)
"Chief Complaint:    Chief Complaint   Patient presents with   • Phimosis        Vital Signs:   /77 (BP Location: Right arm, Patient Position: Sitting)   Pulse 84   Ht 180.3 cm (70.98\")   Wt 109 kg (240 lb)   BMI 33.49 kg/m²   Body mass index is 33.49 kg/m².      HPI:  Miki Jett is a 72 y.o. male who presents today for initial evaluation     History of Present Illness  Mr. Jett presents to the clinic for evaluation of phimosis.  He was last seen in office by Dr. Wilburn in 2018 where he was diagnosed with prostate carcinoma.  He underwent a radical prostatectomy at that time and reports a normal PSA since.  States that he has had difficulty retracting the foreskin for the past year.  Reports that it is gotten even tighter more recently and he is unable to retract it at all.  He reports a very narrow opening to help with urination.  He is able to still urinate sitting down.  On physical exam today he has a severe phimosis with inability to retract foreskin.  This time I am recommending an urgent elective circumcision in the OR setting.  I will give the patient Lotrisone cream to apply twice daily to help with balanitis and inflammation.      Past Medical History:  Past Medical History:   Diagnosis Date   • Anxiety    • Cancer 10/2018    Prostate cancer   • Elevated PSA    • Encounter for monitoring sotalol therapy    • Hyperlipidemia    • Hypertension    • Obesity    • Paroxysmal A-fib        Current Meds:  Current Outpatient Medications   Medication Sig Dispense Refill   • apixaban (ELIQUIS) 5 MG tablet tablet Take 1 tablet by mouth 2 (Two) Times a Day. 60 tablet 2   • Blood Glucose Monitoring Suppl (TRUE METRIX METER) w/Device kit      • EASY COMFORT PEN NEEDLES 31G X 8 MM misc Daily. as directed  0   • folic acid (FOLVITE) 1 MG tablet Take 1 tablet by mouth Daily.     • lisinopril (PRINIVIL,ZESTRIL) 5 MG tablet Take 1 tablet by mouth Daily. for blood pressure     • LORazepam (ATIVAN) 1 MG " tablet TAKE ONE TABLET BY MOUTH EVERY DAY AS NEEDED ANXIETY AND OR SLEEP     • metFORMIN (GLUCOPHAGE) 500 MG tablet Take 1 tablet by mouth 2 (Two) Times a Day With Meals.     • methotrexate 2.5 MG tablet Take 4 tablets every Friday     • sertraline (ZOLOFT) 100 MG tablet Take 1 tablet by mouth Daily.     • sotalol (BETAPACE) 80 MG tablet Take 1 tablet by mouth 2 (Two) Times a Day.     • Tresiba FlexTouch 100 UNIT/ML solution pen-injector injection Inject 44 Units under the skin into the appropriate area as directed Daily.     • TRUE METRIX BLOOD GLUCOSE TEST test strip      • TRUEPLUS LANCETS 30G misc      • clotrimazole-betamethasone (Lotrisone) 1-0.05 % cream Apply 1 application topically to the appropriate area as directed 2 (Two) Times a Day. 45 g 2   • fenofibrate (TRICOR) 145 MG tablet Take 145 mg by mouth Daily. (Patient not taking: Reported on 2023)     • pantoprazole (PROTONIX) 40 MG EC tablet Take 40 mg by mouth Daily. (Patient not taking: Reported on 2023)  3   • traMADol (ULTRAM) 50 MG tablet Take 50 mg by mouth As Needed. (Patient not taking: Reported on 2023)       No current facility-administered medications for this visit.        Allergies:   No Known Allergies     Past Surgical History:  Past Surgical History:   Procedure Laterality Date   • CARDIOVASCULAR STRESS TEST     • CATARACT EXTRACTION     • ECHO - CONVERTED     • PROSTATECTOMY  2019    at [place]        Social History:  Social History     Socioeconomic History   • Marital status:    Tobacco Use   • Smoking status: Former     Packs/day: 0.50     Years: 3.00     Pack years: 1.50     Types: Cigarettes     Quit date:      Years since quittin.3   • Smokeless tobacco: Former     Types: Chew   Vaping Use   • Vaping Use: Never used   Substance and Sexual Activity   • Alcohol use: No   • Drug use: No   • Sexual activity: Defer       Family History:  Family History   Problem Relation Age of Onset   • No  Known Problems Mother    • Lung cancer Father    • Prostate cancer Brother    • Heart disease Sister        Review of Systems:  Review of Systems   Constitutional: Negative for chills, fatigue and fever.   Respiratory: Negative for cough, shortness of breath and wheezing.    Gastrointestinal: Negative for abdominal pain, nausea and vomiting.   Genitourinary: Positive for difficulty urinating. Negative for dysuria, frequency, penile pain, scrotal swelling, testicular pain and urgency.   Musculoskeletal: Negative for back pain and joint swelling.   Neurological: Negative for dizziness and headaches.   Psychiatric/Behavioral: Negative for confusion.       Physical Exam:  Physical Exam  Constitutional:       General: He is not in acute distress.     Appearance: Normal appearance.   HENT:      Head: Normocephalic and atraumatic.      Nose: Nose normal.      Mouth/Throat:      Mouth: Mucous membranes are moist.   Eyes:      Conjunctiva/sclera: Conjunctivae normal.   Cardiovascular:      Rate and Rhythm: Normal rate and regular rhythm.      Pulses: Normal pulses.      Heart sounds: Normal heart sounds.   Pulmonary:      Effort: Pulmonary effort is normal.      Breath sounds: Normal breath sounds.   Abdominal:      General: Bowel sounds are normal.      Palpations: Abdomen is soft.   Genitourinary:     Comments: Phimosis  Musculoskeletal:         General: Normal range of motion.      Cervical back: Normal range of motion.   Skin:     General: Skin is warm.   Neurological:      General: No focal deficit present.      Mental Status: He is alert and oriented to person, place, and time.   Psychiatric:         Mood and Affect: Mood normal.         Behavior: Behavior normal.         Thought Content: Thought content normal.         Judgment: Judgment normal.                    Recent Image (CT and/or KUB):   CT Abdomen and Pelvis: No results found for this or any previous visit.     CT Stone Protocol: No results found for this or  any previous visit.     KUB: No results found for this or any previous visit.       Labs:  Brief Urine Lab Results     None        No visits with results within 3 Month(s) from this visit.   Latest known visit with results is:   Hospital Outpatient Visit on 07/01/2021   Component Date Value Ref Range Status   • Target HR (85%) 07/01/2021 126  bpm Final   • Max. Pred. HR (100%) 07/01/2021 148  bpm Final   • BH CV REST NUCLEAR ISOTOPE DOSE 07/01/2021 9.8  mCi Final   • Nuclear Prior Study 07/01/2021 3   Final   • Exercise duration (sec) 07/01/2021 24  sec Final   • Exercise duration (min) 07/01/2021 6  min Final   • Estimated workload 07/01/2021 7.0  METS Final   • BH CV STRESS NUCLEAR ISOTOPE DOSE 07/01/2021 30.1  mCi Final   • BH CV STRESS PROTOCOL 1 07/01/2021 Segun   Final   • Stage 1 07/01/2021 1   Final   • HR Stage 1 07/01/2021 106   Final   • BP Stage 1 07/01/2021 114/60   Final   • Duration Min Stage 1 07/01/2021 3   Final   • Duration Sec Stage 1 07/01/2021 0   Final   • Grade Stage 1 07/01/2021 10   Final   • Speed Stage 1 07/01/2021 1.7   Final   • BH CV STRESS METS STAGE 1 07/01/2021 5   Final   • Stage 2 07/01/2021 2   Final   • HR Stage 2 07/01/2021 119   Final   • BP Stage 2 07/01/2021 150/93   Final   • Duration Min Stage 2 07/01/2021 3   Final   • Duration Sec Stage 2 07/01/2021 0   Final   • Grade Stage 2 07/01/2021 12   Final   • Speed Stage 2 07/01/2021 2.5   Final   • BH CV STRESS METS STAGE 2 07/01/2021 7.5   Final   • Stage 3 07/01/2021 3   Final   • HR Stage 3 07/01/2021 129   Final   • Duration Min Stage 3 07/01/2021 0   Final   • Duration Sec Stage 3 07/01/2021 24   Final   • Grade Stage 3 07/01/2021 14   Final   • Speed Stage 3 07/01/2021 3.4   Final   • BH CV STRESS METS STAGE 3 07/01/2021 10.0   Final   • Baseline HR 07/01/2021 78  bpm Final   • Baseline BP 07/01/2021 150/89  mmHg Final   • Peak HR 07/01/2021 129  bpm Final   • Percent Max Pred HR 07/01/2021 87.16  % Final   • Percent  Target HR 07/01/2021 103  % Final   • Peak BP 07/01/2021 150/93  mmHg Final   • Recovery HR 07/01/2021 88  bpm Final   • Recovery BP 07/01/2021 112/60  mmHg Final   • Nuc Stress EF 07/01/2021 63  % Final        Procedure: None  Procedures      I have reviewed and agree with the above PMH, PSH, FMH, social history, medications, allergies, and labs.     Assessment/Plan:   Problem List Items Addressed This Visit        Genitourinary and Reproductive     Phimosis - Primary    Overview     Added automatically from request for surgery [id number]         Relevant Medications    clotrimazole-betamethasone (Lotrisone) 1-0.05 % cream    Other Relevant Orders    Case Request (Completed)    CBC (No Diff)    Basic Metabolic Panel   Other Visit Diagnoses     Abnormal CBC              Health Maintenance:   Health Maintenance Due   Topic Date Due   • URINE MICROALBUMIN  Never done   • COLORECTAL CANCER SCREENING  Never done   • COVID-19 Vaccine (1) Never done   • Pneumococcal Vaccine 65+ (1 - PCV) Never done   • TDAP/TD VACCINES (1 - Tdap) Never done   • ZOSTER VACCINE (1 of 2) Never done   • HEPATITIS C SCREENING  Never done   • ANNUAL WELLNESS VISIT  Never done   • DIABETIC FOOT EXAM  Never done   • LIPID PANEL  06/16/2017   • HEMOGLOBIN A1C  Never done   • DIABETIC EYE EXAM  Never done        Smoking Counseling: Former smoker.  Former user of smokeless tobacco.    Urine Incontinence: Patient reports that he is experiencing symptoms of urinary incontinence that has occurred since radical prostatectomy.    Patient was given instructions and counseling regarding his condition or for health maintenance advice. Please see specific information pulled into the AVS if appropriate.    Patient Education:   Phimosis -discussed with the patient the pathophysiology of phimosis.  Discussed treatment which can include medications versus surgery.  Did discuss with the patient the use of a dorsal slit versus circumcision.  Patient would like to try  to wait on circumcision.  Discussed this procedure in detail with the patient including risks and benefits.  Patient is currently on Eliquis daily and he will need cardiac clearance from Dr. Olson before he can undergo an elective circumcision in the OR setting.  We will schedule him tentatively for 5/22/2023 with Dr. Hung.  I will send in Lotrisone cream for the patient to apply twice daily to the affected area to help with inflammation and swelling.  Advised the patient to return to the clinic or go the nearest ER if he is unable to urinate or begins to experience severe penile pain.  Patient verbalizes understanding and agrees to plan of care.    Visit Diagnoses:    ICD-10-CM ICD-9-CM   1. Phimosis  N47.1 605   2. Abnormal CBC  R79.89 790.6       Meds Ordered During Visit:  New Medications Ordered This Visit   Medications   • clotrimazole-betamethasone (Lotrisone) 1-0.05 % cream     Sig: Apply 1 application topically to the appropriate area as directed 2 (Two) Times a Day.     Dispense:  45 g     Refill:  2       Follow Up Appointment: Elective circumcision on 5/22/2023  No follow-ups on file.      This document has been electronically signed by Chun Kimball PA-C   May 11, 2023 14:16 EDT    Part of this note may be an electronic transcription/translation of spoken language to printed text using the Dragon Dictation System.

## 2023-05-15 ENCOUNTER — OFFICE VISIT (OUTPATIENT)
Dept: CARDIOLOGY | Facility: CLINIC | Age: 73
End: 2023-05-15
Payer: MEDICARE

## 2023-05-15 VITALS
SYSTOLIC BLOOD PRESSURE: 137 MMHG | WEIGHT: 240 LBS | HEART RATE: 83 BPM | DIASTOLIC BLOOD PRESSURE: 74 MMHG | BODY MASS INDEX: 33.6 KG/M2 | HEIGHT: 71 IN | OXYGEN SATURATION: 93 %

## 2023-05-15 DIAGNOSIS — Z79.01 CHRONIC ANTICOAGULATION: ICD-10-CM

## 2023-05-15 DIAGNOSIS — I48.19 PERSISTENT ATRIAL FIBRILLATION: Primary | ICD-10-CM

## 2023-05-15 DIAGNOSIS — Z01.818 PREOPERATIVE CLEARANCE: ICD-10-CM

## 2023-05-15 DIAGNOSIS — I10 PRIMARY HYPERTENSION: ICD-10-CM

## 2023-05-15 DIAGNOSIS — E78.2 MIXED HYPERLIPIDEMIA: ICD-10-CM

## 2023-05-15 DIAGNOSIS — Z79.899 ENCOUNTER FOR MONITORING SOTALOL THERAPY: ICD-10-CM

## 2023-05-15 DIAGNOSIS — Z51.81 ENCOUNTER FOR MONITORING SOTALOL THERAPY: ICD-10-CM

## 2023-05-15 PROCEDURE — 3078F DIAST BP <80 MM HG: CPT | Performed by: INTERNAL MEDICINE

## 2023-05-15 PROCEDURE — 93000 ELECTROCARDIOGRAM COMPLETE: CPT | Performed by: INTERNAL MEDICINE

## 2023-05-15 PROCEDURE — 99214 OFFICE O/P EST MOD 30 MIN: CPT | Performed by: INTERNAL MEDICINE

## 2023-05-15 PROCEDURE — 3075F SYST BP GE 130 - 139MM HG: CPT | Performed by: INTERNAL MEDICINE

## 2023-05-15 NOTE — PROGRESS NOTES
subjective     Chief Complaint   Patient presents with   • Surgical Clearance     Pt needing clearance for circumcision.      History of Present Illness    Patient is 72 years old white male who is planning to have circumcision and is here for preop cardiac clearance because of anticoagulation with Eliquis.    He has history of paroxysmal atrial fibrillation.  He is maintaining sinus rhythm on Betapace therapy.  Heart rate is around 80 bpm .    He is anticoagulated with Eliquis 5 twice daily.  No abnormal bleeding or bruising.    Hypertension  Well-controlled with lisinopril 5 mg daily    He also has history of hyperlipidemia and diabetes mellitus and is following closely with his PCP CHINO Xie.    He denies any chest pain palpitations or shortness of breath.    Past Surgical History:   Procedure Laterality Date   • CARDIOVASCULAR STRESS TEST  2012   • CATARACT EXTRACTION     • ECHO - CONVERTED  2012   • PROSTATECTOMY  08/27/2019    at [place]      Family History   Problem Relation Age of Onset   • No Known Problems Mother    • Lung cancer Father    • Prostate cancer Brother    • Heart disease Sister      Past Medical History:   Diagnosis Date   • Anxiety    • Cancer 10/2018    Prostate cancer   • Elevated PSA    • Encounter for monitoring sotalol therapy    • Hyperlipidemia    • Hypertension    • Obesity    • Paroxysmal A-fib      Patient Active Problem List   Diagnosis   • Persistent atrial fibrillation (HCC)   • Encounter for monitoring sotalol therapy   • Hypertension   • Hyperlipidemia   • Obesity   • Controlled type 2 diabetes mellitus without complication, without long-term current use of insulin   • Prostate cancer   • Chronic anticoagulation with eliquis   • Multiple risk factors for coronary artery disease   • Phimosis   • Preoperative clearance       Social History     Tobacco Use   • Smoking status: Former     Packs/day: 0.50     Years: 3.00     Pack years: 1.50     Types: Cigarettes      Quit date:      Years since quittin.3   • Smokeless tobacco: Former     Types: Chew   Vaping Use   • Vaping Use: Never used   Substance Use Topics   • Alcohol use: No   • Drug use: No       No Known Allergies    Current Outpatient Medications on File Prior to Visit   Medication Sig   • apixaban (ELIQUIS) 5 MG tablet tablet Take 1 tablet by mouth 2 (Two) Times a Day.   • Blood Glucose Monitoring Suppl (TRUE METRIX METER) w/Device kit    • clotrimazole-betamethasone (Lotrisone) 1-0.05 % cream Apply 1 application topically to the appropriate area as directed 2 (Two) Times a Day.   • EASY COMFORT PEN NEEDLES 31G X 8 MM misc Daily. as directed   • fenofibrate (TRICOR) 145 MG tablet Take 1 tablet by mouth Daily.   • folic acid (FOLVITE) 1 MG tablet Take 1 tablet by mouth Daily.   • lisinopril (PRINIVIL,ZESTRIL) 5 MG tablet Take 1 tablet by mouth Daily. for blood pressure   • LORazepam (ATIVAN) 1 MG tablet TAKE ONE TABLET BY MOUTH EVERY DAY AS NEEDED ANXIETY AND OR SLEEP   • metFORMIN (GLUCOPHAGE) 500 MG tablet Take 1 tablet by mouth 2 (Two) Times a Day With Meals.   • methotrexate 2.5 MG tablet Take 4 tablets every Friday   • pantoprazole (PROTONIX) 40 MG EC tablet Take 1 tablet by mouth Daily.   • sertraline (ZOLOFT) 100 MG tablet Take 1 tablet by mouth Daily.   • sotalol (BETAPACE) 80 MG tablet Take 1 tablet by mouth 2 (Two) Times a Day.   • traMADol (ULTRAM) 50 MG tablet Take 1 tablet by mouth As Needed.   • Tresiba FlexTouch 100 UNIT/ML solution pen-injector injection Inject 44 Units under the skin into the appropriate area as directed Daily.   • TRUE METRIX BLOOD GLUCOSE TEST test strip    • TRUEPLUS LANCETS 30G misc      No current facility-administered medications on file prior to visit.         The following portions of the patient's history were reviewed and updated as appropriate: allergies, current medications, past family history, past medical history, past social history, past surgical history and  "problem list.    Review of Systems   Constitutional: Negative.   HENT: Negative.  Negative for congestion.    Eyes: Negative.    Cardiovascular: Negative.  Negative for chest pain, cyanosis, dyspnea on exertion, irregular heartbeat, leg swelling, near-syncope, orthopnea, palpitations, paroxysmal nocturnal dyspnea and syncope.   Respiratory: Negative.  Negative for shortness of breath.    Hematologic/Lymphatic: Negative.    Musculoskeletal: Negative.    Gastrointestinal: Negative.    Neurological: Negative.  Negative for headaches.          Objective:     /74 (BP Location: Right arm, Patient Position: Sitting, Cuff Size: Adult)   Pulse 83   Ht 180.3 cm (70.98\")   Wt 109 kg (240 lb)   SpO2 93%   BMI 33.49 kg/m²   Pulmonary:      Effort: Pulmonary effort is normal.      Breath sounds: Normal breath sounds. No stridor. No wheezing. No rhonchi. No rales.   Cardiovascular:      PMI at left midclavicular line. Normal rate. Regular rhythm. Normal S1. Normal S2.      Murmurs: There is no murmur.      No gallop. No click. No rub.   Pulses:     Intact distal pulses.   Edema:     Peripheral edema absent.           Lab Review  Lab Results   Component Value Date     08/02/2019    K 3.7 08/02/2019     08/02/2019    BUN 20 08/02/2019    CREATININE 0.85 08/02/2019    GLUCOSE 126 (H) 03/02/2015     (H) 08/02/2019    CALCIUM 9.0 08/02/2019    ALT 25 03/02/2015    ALKPHOS 83 03/02/2015    LABIL2 1.3 (L) 03/02/2015     No results found for: CKTOTAL  Lab Results   Component Value Date    WBC 9.3 03/02/2015    HGB 14.4 03/02/2015    HCT 37 (L) 08/28/2019     03/02/2015     No results found for: INR  Lab Results   Component Value Date    MG 2.2 09/02/2014     Lab Results   Component Value Date    PSA <0.10 10/21/2019    TSH 3.282 03/02/2015     No results found for: BNP  Lab Results   Component Value Date    CHLPL 166 03/02/2015    TRIG 324 (H) 03/02/2015    HDL 38 (L) 03/02/2015    VLDL 65 (H) " 03/02/2015     Lab Results   Component Value Date    LDL 63 03/02/2015     No results found for: PROBNP              ECG 12 Lead    Date/Time: 5/15/2023 3:51 PM  Performed by: Mike Olson MD  Authorized by: Mike Olson MD   Comparison: compared with previous ECG from 11/30/2022  Similar to previous ECG  Rhythm: sinus rhythm  Rate: normal  BPM: 84  Conduction: conduction normal  ST Segments: ST segments normal  T Waves: T waves normal  QRS axis: normal  Other: no other findings    Clinical impression: normal ECG               I personally viewed and interpreted the patient's LAB data         Assessment:     1. Persistent atrial fibrillation (HCC)    2. Chronic anticoagulation with eliquis    3. Preoperative clearance    4. Mixed hyperlipidemia    5. Primary hypertension    6. Encounter for monitoring sotalol therapy          Plan:     Patient is 72 years old white male who is planning to have circumcision is here for preop cardiac clearance because of Eliquis therapy.    He has persistent atrial fibrillation, is maintaining sinus rhythm on Betapace therapy QTc is normal.    Eliquis 5 mg twice daily was continued.  He is asymptomatic and is cleared for surgery blood pressure is also normal with lisinopril      He has hyperlipidemia and diabetes mellitus and is following closely with his PCP.    He is cleared for surgery from cardiac standpoint.  I do not see any current indication.  Eliquis could be held for 2 days prior to surgery and started back after surgery when it is acceptable with the surgeon.          Thank you for giving me the oppertunity to participate in your patient's cardiac care.    Sincerely,    ANTON Olson M.D. FACP FAC      No follow-ups on file.

## 2023-05-15 NOTE — LETTER
May 15, 2023     CHINO Ramos  [address]  [place] [address]    Patient: Miki Jett   YOB: 1950   Date of Visit: 5/15/2023       Dear CHINO Ramos    Miki Jett was in my office today. Below is a copy of my note.    If you have questions, please do not hesitate to call me. I look forward to following Miki along with you.         Sincerely,        Mike Olson MD        CC: MD Jonny Wolfe MD    subjective     Chief Complaint   Patient presents with   • Surgical Clearance     Pt needing clearance for circumcision.      History of Present Illness          Past Surgical History:   Procedure Laterality Date   • CARDIOVASCULAR STRESS TEST     • CATARACT EXTRACTION     • ECHO - CONVERTED     • PROSTATECTOMY  2019    at [place]      Family History   Problem Relation Age of Onset   • No Known Problems Mother    • Lung cancer Father    • Prostate cancer Brother    • Heart disease Sister      Past Medical History:   Diagnosis Date   • Anxiety    • Cancer 10/2018    Prostate cancer   • Elevated PSA    • Encounter for monitoring sotalol therapy    • Hyperlipidemia    • Hypertension    • Obesity    • Paroxysmal A-fib      Patient Active Problem List   Diagnosis   • Persistent atrial fibrillation (HCC)   • Encounter for monitoring sotalol therapy   • Hypertension   • Hyperlipidemia   • Obesity   • Controlled type 2 diabetes mellitus without complication, without long-term current use of insulin   • Prostate cancer   • Chronic anticoagulation with eliquis   • Multiple risk factors for coronary artery disease   • Phimosis       Social History     Tobacco Use   • Smoking status: Former     Packs/day: 0.50     Years: 3.00     Pack years: 1.50     Types: Cigarettes     Quit date:      Years since quittin.3   • Smokeless tobacco: Former     Types: Chew   Vaping Use   • Vaping Use: Never used   Substance Use  Topics   • Alcohol use: No   • Drug use: No       No Known Allergies    Current Outpatient Medications on File Prior to Visit   Medication Sig   • apixaban (ELIQUIS) 5 MG tablet tablet Take 1 tablet by mouth 2 (Two) Times a Day.   • Blood Glucose Monitoring Suppl (TRUE METRIX METER) w/Device kit    • clotrimazole-betamethasone (Lotrisone) 1-0.05 % cream Apply 1 application topically to the appropriate area as directed 2 (Two) Times a Day.   • EASY COMFORT PEN NEEDLES 31G X 8 MM misc Daily. as directed   • fenofibrate (TRICOR) 145 MG tablet Take 1 tablet by mouth Daily.   • folic acid (FOLVITE) 1 MG tablet Take 1 tablet by mouth Daily.   • lisinopril (PRINIVIL,ZESTRIL) 5 MG tablet Take 1 tablet by mouth Daily. for blood pressure   • LORazepam (ATIVAN) 1 MG tablet TAKE ONE TABLET BY MOUTH EVERY DAY AS NEEDED ANXIETY AND OR SLEEP   • metFORMIN (GLUCOPHAGE) 500 MG tablet Take 1 tablet by mouth 2 (Two) Times a Day With Meals.   • methotrexate 2.5 MG tablet Take 4 tablets every Friday   • pantoprazole (PROTONIX) 40 MG EC tablet Take 1 tablet by mouth Daily.   • sertraline (ZOLOFT) 100 MG tablet Take 1 tablet by mouth Daily.   • sotalol (BETAPACE) 80 MG tablet Take 1 tablet by mouth 2 (Two) Times a Day.   • traMADol (ULTRAM) 50 MG tablet Take 1 tablet by mouth As Needed.   • Tresiba FlexTouch 100 UNIT/ML solution pen-injector injection Inject 44 Units under the skin into the appropriate area as directed Daily.   • TRUE METRIX BLOOD GLUCOSE TEST test strip    • TRUEPLUS LANCETS 30G misc      No current facility-administered medications on file prior to visit.         The following portions of the patient's history were reviewed and updated as appropriate: allergies, current medications, past family history, past medical history, past social history, past surgical history and problem list.    ROS      Objective:     /74 (BP Location: Right arm, Patient Position: Sitting, Cuff Size: Adult)   Pulse 83   Ht 180.3 cm  "(70.98\")   Wt 109 kg (240 lb)   SpO2 93%   BMI 33.49 kg/m²   Physical Exam      Lab Review  Lab Results   Component Value Date     08/02/2019    K 3.7 08/02/2019     08/02/2019    BUN 20 08/02/2019    CREATININE 0.85 08/02/2019    GLUCOSE 126 (H) 03/02/2015     (H) 08/02/2019    CALCIUM 9.0 08/02/2019    ALT 25 03/02/2015    ALKPHOS 83 03/02/2015    LABIL2 1.3 (L) 03/02/2015     No results found for: CKTOTAL  Lab Results   Component Value Date    WBC 9.3 03/02/2015    HGB 14.4 03/02/2015    HCT 37 (L) 08/28/2019     03/02/2015     No results found for: INR  Lab Results   Component Value Date    MG 2.2 09/02/2014     Lab Results   Component Value Date    PSA <0.10 10/21/2019    TSH 3.282 03/02/2015     No results found for: BNP  Lab Results   Component Value Date    CHLPL 166 03/02/2015    TRIG 324 (H) 03/02/2015    HDL 38 (L) 03/02/2015    VLDL 65 (H) 03/02/2015     Lab Results   Component Value Date    LDL 63 03/02/2015     No results found for: PROBNP            Procedures       I personally viewed and interpreted the patient's LAB data        Assessment:   No diagnosis found.      Plan:              No follow-ups on file.    "

## 2023-05-16 ENCOUNTER — PATIENT ROUNDING (BHMG ONLY) (OUTPATIENT)
Dept: CARDIOLOGY | Facility: CLINIC | Age: 73
End: 2023-05-16
Payer: MEDICARE

## 2023-05-16 NOTE — PROGRESS NOTES
Olayinka. My name is Che. I am a MA at [facility]      I want to officially welcome you to our practice and ask about your recent visit.         What things do you feel went well with your visit?        Do you have recommendations on ways we may improve?        Overall were you satisfied with your first visit to our practice?        I appreciate you taking the time to answer a few questions today.         We're always looking for ways to make our patients' experiences even better. Please complete the Shipu Survey after your visits. We would love to receive feedback about your visits. You may also share any suggestions or concerns by replying to this message or calling our office.         Thank you for allowing us to participate in your healthcare. Please let me know if I can be of further assistance.                Kind regards,      Che

## 2023-05-17 NOTE — DISCHARGE INSTRUCTIONS
05/22/23  ARRIVAL TIME PER DR CARNEY'S OFFICE  TAKE the following medications the morning of surgery:  All heart or blood pressure medications    HOLD all diabetic medications the morning of surgery as ordered by physician.    Please discontinue all blood thinners and anticoagulants (except aspirin) prior to surgery as per your surgeon and cardiologist instructions.  Aspirin may be continued up to the day prior to surgery.     CHLORHEXIDINE CLOTHS GIVEN WITH INSTRUCTIONS AND FORM TO RETURN TO HOSPITAL, IF APPLICABLE.    General Instructions:  Do not eat or drink after midnight: includes water, mints, or gum. You may brush your teeth.  Dental appliances that are removable must be taken out day of surgery.  Do not smoke, chew tobacco, or drink alcohol.  Bring medications in original bottles, any inhalers and if applicable your C-PAP/BI-PAP machine.  Bring any papers given to you in the doctor's office.  Wear clean comfortable clothes and socks.  Do not wear contact lenses or make-up. Bring a case for your glasses if applicable.  Bring crutches or walker if applicable.  Leave all other valuables and jewelry at home.    If you were given a blood bank ID arm band remember to bring it with you the day of surgery.    Preventing a Surgical Site Infection:  Shower the night before surgery (unless instructed other wise) using a fresh bar of anti-bacterial soap (such as Dial) and clean washcloth. Dry with a clean towel and dress in clean clothing.  For 2 to 3 days before surgery, avoid shaving with a razor near where you will have surgery because the razor can irritate skin and make it easier to develop an infection. Ask your surgeon if you will be receiving antibiotics prior to surgery.  Make sure you, your family, and all healthcare providers clear their hands with soap and water or an alcohol-based hand  before caring for you or your wound.  If at all possible, quit smoking as many days before surgery as you  can.    Day of surgery:  Upon arrival, a Pre-op nurse and Anesthesiologist will review your health history, obtain vital signs, and answer questions you may have. The only belongings needed at this time will be your home medications and if applicable your C-PAP/BI-PAP machine. If you are staying overnight your family can leave the rest of your belongings in the car and bring them to your room later. A Pre-op nurse will start an IV and you may receive medication in preparation for surgery, including something to help you relax. Your family will be able to see you in the Pre-op area. While you are in surgery your family should notify the waiting room  if they leave the waiting room area and provide a contact phone number.    Please be aware that surgery does come with discomfort. We want to make every effort to control your discomfort so please discuss any uncontrolled symptoms with your nurse. Your doctor will most likely have prescribed pain medications.    If you are going home after surgery you will receive individualized written care instructions before being discharged. A responsible adult must drive you to and from the hospital on the day of surgery and stay with you for 24 hours.    If you are staying overnight following surgery, you will be transported to your hospital room following the recovery period.  [facility] has all private rooms.    If you have any questions please call Pre-Admission Testing at [phone].  Deductibles and co-payments are collected on the day of service. Please be prepared to pay the required co-pay, deductible or deposit on the day of service as defined by your plan.    A RESPONSIBLE PERSON MUST REMAIN IN THE WAITING ROOM DURING YOUR PROCEDURE AND A RESPONSIBLE  MUST BE AVAILABLE UPON YOUR DISCHARGE.

## 2023-05-19 ENCOUNTER — PRE-ADMISSION TESTING (OUTPATIENT)
Dept: PREADMISSION TESTING | Facility: HOSPITAL | Age: 73
End: 2023-05-19
Payer: MEDICARE

## 2023-05-19 LAB
ANION GAP SERPL CALCULATED.3IONS-SCNC: 11.4 MMOL/L (ref 5–15)
BUN SERPL-MCNC: 17 MG/DL (ref 8–23)
BUN/CREAT SERPL: 19.1 (ref 7–25)
CALCIUM SPEC-SCNC: 9.6 MG/DL (ref 8.6–10.5)
CHLORIDE SERPL-SCNC: 102 MMOL/L (ref 98–107)
CO2 SERPL-SCNC: 23.6 MMOL/L (ref 22–29)
CREAT SERPL-MCNC: 0.89 MG/DL (ref 0.76–1.27)
DEPRECATED RDW RBC AUTO: 55.1 FL (ref 37–54)
EGFRCR SERPLBLD CKD-EPI 2021: 89.9 ML/MIN/1.73
ERYTHROCYTE [DISTWIDTH] IN BLOOD BY AUTOMATED COUNT: 16.3 % (ref 12.3–15.4)
GLUCOSE SERPL-MCNC: 144 MG/DL (ref 65–99)
HCT VFR BLD AUTO: 41 % (ref 37.5–51)
HGB BLD-MCNC: 13.3 G/DL (ref 13–17.7)
MCH RBC QN AUTO: 30.2 PG (ref 26.6–33)
MCHC RBC AUTO-ENTMCNC: 32.4 G/DL (ref 31.5–35.7)
MCV RBC AUTO: 93.2 FL (ref 79–97)
PLATELET # BLD AUTO: 277 10*3/MM3 (ref 140–450)
PMV BLD AUTO: 9.3 FL (ref 6–12)
POTASSIUM SERPL-SCNC: 4.1 MMOL/L (ref 3.5–5.2)
RBC # BLD AUTO: 4.4 10*6/MM3 (ref 4.14–5.8)
SODIUM SERPL-SCNC: 137 MMOL/L (ref 136–145)
WBC NRBC COR # BLD: 12.65 10*3/MM3 (ref 3.4–10.8)

## 2023-05-19 PROCEDURE — 85027 COMPLETE CBC AUTOMATED: CPT

## 2023-05-19 PROCEDURE — 80048 BASIC METABOLIC PNL TOTAL CA: CPT

## 2023-05-19 PROCEDURE — 36415 COLL VENOUS BLD VENIPUNCTURE: CPT

## 2023-05-22 ENCOUNTER — ANESTHESIA (OUTPATIENT)
Dept: PERIOP | Facility: HOSPITAL | Age: 73
End: 2023-05-22
Payer: MEDICARE

## 2023-05-22 ENCOUNTER — HOSPITAL ENCOUNTER (OUTPATIENT)
Facility: HOSPITAL | Age: 73
Setting detail: HOSPITAL OUTPATIENT SURGERY
Discharge: HOME | End: 2023-05-22
Attending: UROLOGY | Admitting: UROLOGY
Payer: MEDICARE

## 2023-05-22 ENCOUNTER — ANESTHESIA EVENT (OUTPATIENT)
Dept: PERIOP | Facility: HOSPITAL | Age: 73
End: 2023-05-22
Payer: MEDICARE

## 2023-05-22 VITALS
BODY MASS INDEX: 31.33 KG/M2 | SYSTOLIC BLOOD PRESSURE: 116 MMHG | OXYGEN SATURATION: 96 % | HEART RATE: 75 BPM | DIASTOLIC BLOOD PRESSURE: 65 MMHG | HEIGHT: 71 IN | TEMPERATURE: 97.2 F | WEIGHT: 223.8 LBS | RESPIRATION RATE: 18 BRPM

## 2023-05-22 DIAGNOSIS — N47.1 PHIMOSIS: ICD-10-CM

## 2023-05-22 LAB — GLUCOSE BLDC GLUCOMTR-MCNC: 135 MG/DL (ref 70–130)

## 2023-05-22 PROCEDURE — 25010000002 ONDANSETRON PER 1 MG: Performed by: NURSE ANESTHETIST, CERTIFIED REGISTERED

## 2023-05-22 PROCEDURE — 82948 REAGENT STRIP/BLOOD GLUCOSE: CPT

## 2023-05-22 PROCEDURE — 25010000002 FENTANYL CITRATE (PF) 50 MCG/ML SOLUTION: Performed by: NURSE ANESTHETIST, CERTIFIED REGISTERED

## 2023-05-22 PROCEDURE — 25010000002 PROPOFOL 10 MG/ML EMULSION: Performed by: NURSE ANESTHETIST, CERTIFIED REGISTERED

## 2023-05-22 PROCEDURE — 25010000002 GENTAMICIN PER 80 MG

## 2023-05-22 PROCEDURE — 54161 CIRCUM 28 DAYS OR OLDER: CPT | Performed by: UROLOGY

## 2023-05-22 RX ORDER — FENTANYL CITRATE 50 UG/ML
INJECTION, SOLUTION INTRAMUSCULAR; INTRAVENOUS AS NEEDED
Status: DISCONTINUED | OUTPATIENT
Start: 2023-05-22 | End: 2023-05-22 | Stop reason: SURG

## 2023-05-22 RX ORDER — BUPIVACAINE HYDROCHLORIDE AND EPINEPHRINE 2.5; 5 MG/ML; UG/ML
INJECTION, SOLUTION EPIDURAL; INFILTRATION; INTRACAUDAL; PERINEURAL AS NEEDED
Status: DISCONTINUED | OUTPATIENT
Start: 2023-05-22 | End: 2023-05-22 | Stop reason: HOSPADM

## 2023-05-22 RX ORDER — HYDROCODONE BITARTRATE AND ACETAMINOPHEN 10; 325 MG/1; MG/1
1 TABLET ORAL EVERY 6 HOURS PRN
Qty: 12 TABLET | Refills: 0 | Status: SHIPPED | OUTPATIENT
Start: 2023-05-22

## 2023-05-22 RX ORDER — SODIUM CHLORIDE, SODIUM LACTATE, POTASSIUM CHLORIDE, CALCIUM CHLORIDE 600; 310; 30; 20 MG/100ML; MG/100ML; MG/100ML; MG/100ML
INJECTION, SOLUTION INTRAVENOUS CONTINUOUS PRN
Status: DISCONTINUED | OUTPATIENT
Start: 2023-05-22 | End: 2023-05-22 | Stop reason: SURG

## 2023-05-22 RX ORDER — IPRATROPIUM BROMIDE AND ALBUTEROL SULFATE 2.5; .5 MG/3ML; MG/3ML
3 SOLUTION RESPIRATORY (INHALATION) ONCE AS NEEDED
Status: DISCONTINUED | OUTPATIENT
Start: 2023-05-22 | End: 2023-05-22 | Stop reason: HOSPADM

## 2023-05-22 RX ORDER — FENTANYL CITRATE 50 UG/ML
50 INJECTION, SOLUTION INTRAMUSCULAR; INTRAVENOUS
Status: DISCONTINUED | OUTPATIENT
Start: 2023-05-22 | End: 2023-05-22 | Stop reason: HOSPADM

## 2023-05-22 RX ORDER — LIDOCAINE HYDROCHLORIDE 20 MG/ML
INJECTION, SOLUTION EPIDURAL; INFILTRATION; INTRACAUDAL; PERINEURAL AS NEEDED
Status: DISCONTINUED | OUTPATIENT
Start: 2023-05-22 | End: 2023-05-22 | Stop reason: SURG

## 2023-05-22 RX ORDER — SODIUM CHLORIDE, SODIUM LACTATE, POTASSIUM CHLORIDE, CALCIUM CHLORIDE 600; 310; 30; 20 MG/100ML; MG/100ML; MG/100ML; MG/100ML
100 INJECTION, SOLUTION INTRAVENOUS ONCE AS NEEDED
Status: DISCONTINUED | OUTPATIENT
Start: 2023-05-22 | End: 2023-05-22 | Stop reason: HOSPADM

## 2023-05-22 RX ORDER — ONDANSETRON 2 MG/ML
INJECTION INTRAMUSCULAR; INTRAVENOUS AS NEEDED
Status: DISCONTINUED | OUTPATIENT
Start: 2023-05-22 | End: 2023-05-22 | Stop reason: SURG

## 2023-05-22 RX ORDER — EPHEDRINE SULFATE 5 MG/ML
INJECTION INTRAVENOUS AS NEEDED
Status: DISCONTINUED | OUTPATIENT
Start: 2023-05-22 | End: 2023-05-22 | Stop reason: SURG

## 2023-05-22 RX ORDER — SODIUM CHLORIDE 0.9 % (FLUSH) 0.9 %
10 SYRINGE (ML) INJECTION AS NEEDED
Status: DISCONTINUED | OUTPATIENT
Start: 2023-05-22 | End: 2023-05-22 | Stop reason: HOSPADM

## 2023-05-22 RX ORDER — FAMOTIDINE 10 MG/ML
INJECTION, SOLUTION INTRAVENOUS AS NEEDED
Status: DISCONTINUED | OUTPATIENT
Start: 2023-05-22 | End: 2023-05-22 | Stop reason: SURG

## 2023-05-22 RX ORDER — GENTAMICIN SULFATE 80 MG/100ML
80 INJECTION, SOLUTION INTRAVENOUS ONCE
Status: COMPLETED | OUTPATIENT
Start: 2023-05-22 | End: 2023-05-22

## 2023-05-22 RX ORDER — MAGNESIUM HYDROXIDE 1200 MG/15ML
LIQUID ORAL AS NEEDED
Status: DISCONTINUED | OUTPATIENT
Start: 2023-05-22 | End: 2023-05-22 | Stop reason: HOSPADM

## 2023-05-22 RX ORDER — SODIUM CHLORIDE, SODIUM LACTATE, POTASSIUM CHLORIDE, CALCIUM CHLORIDE 600; 310; 30; 20 MG/100ML; MG/100ML; MG/100ML; MG/100ML
1000 INJECTION, SOLUTION INTRAVENOUS CONTINUOUS
Status: DISCONTINUED | OUTPATIENT
Start: 2023-05-22 | End: 2023-05-22 | Stop reason: HOSPADM

## 2023-05-22 RX ORDER — MEPERIDINE HYDROCHLORIDE 25 MG/ML
12.5 INJECTION INTRAMUSCULAR; INTRAVENOUS; SUBCUTANEOUS
Status: DISCONTINUED | OUTPATIENT
Start: 2023-05-22 | End: 2023-05-22 | Stop reason: HOSPADM

## 2023-05-22 RX ORDER — OXYCODONE HYDROCHLORIDE AND ACETAMINOPHEN 5; 325 MG/1; MG/1
1 TABLET ORAL ONCE AS NEEDED
Status: DISCONTINUED | OUTPATIENT
Start: 2023-05-22 | End: 2023-05-22 | Stop reason: HOSPADM

## 2023-05-22 RX ORDER — ONDANSETRON 2 MG/ML
4 INJECTION INTRAMUSCULAR; INTRAVENOUS AS NEEDED
Status: DISCONTINUED | OUTPATIENT
Start: 2023-05-22 | End: 2023-05-22 | Stop reason: HOSPADM

## 2023-05-22 RX ORDER — PROPOFOL 10 MG/ML
VIAL (ML) INTRAVENOUS AS NEEDED
Status: DISCONTINUED | OUTPATIENT
Start: 2023-05-22 | End: 2023-05-22 | Stop reason: SURG

## 2023-05-22 RX ADMIN — PROPOFOL 100 MG: 10 INJECTION, EMULSION INTRAVENOUS at 10:05

## 2023-05-22 RX ADMIN — EPHEDRINE SULFATE 10 MG: 5 INJECTION INTRAVENOUS at 10:25

## 2023-05-22 RX ADMIN — LIDOCAINE HYDROCHLORIDE 60 MG: 20 INJECTION, SOLUTION EPIDURAL; INFILTRATION; INTRACAUDAL; PERINEURAL at 10:02

## 2023-05-22 RX ADMIN — SODIUM CHLORIDE, POTASSIUM CHLORIDE, SODIUM LACTATE AND CALCIUM CHLORIDE: 600; 310; 30; 20 INJECTION, SOLUTION INTRAVENOUS at 10:02

## 2023-05-22 RX ADMIN — FENTANYL CITRATE 100 MCG: 50 INJECTION INTRAMUSCULAR; INTRAVENOUS at 10:02

## 2023-05-22 RX ADMIN — ONDANSETRON 4 MG: 2 INJECTION INTRAMUSCULAR; INTRAVENOUS at 10:02

## 2023-05-22 RX ADMIN — FAMOTIDINE 20 MG: 10 INJECTION, SOLUTION INTRAVENOUS at 10:02

## 2023-05-22 RX ADMIN — GENTAMICIN SULFATE 80 MG: 80 INJECTION, SOLUTION INTRAVENOUS at 10:02

## 2023-05-22 RX ADMIN — EPHEDRINE SULFATE 10 MG: 5 INJECTION INTRAVENOUS at 10:21

## 2023-05-22 NOTE — OP NOTE
CIRCUMCISION  Procedure Note    Miki Jett  5/22/2023    Pre-op Diagnosis:   Phimosis [N47.1]    Post-op Diagnosis:     Post-Op Diagnosis Codes:     * Phimosis [N47.1]    Procedure/CPT® Codes:  72-year-old white male with tight phimosis and ballooning of his foreskin when he urinates and multiple preputial adhesions following an extensive informed consent elective circumcision--after a significant and appropriate review of the risks and benefits associated with the circumcision including the risk of anesthesia, bleeding, infection, cellulitis of the penis, foreshortening or tenting of the penis with an erection, the rare complication of development of Peyronie's Disease as well as dysesthesias or hyperesthesias of the penis.  He was brought to the operative suite after an appropriate anesthetic.  The penis was prepped and draped in a sterile fashion. I used 1% Xylocaine without epinephrine to do a block circumferentially 8 mm behind the coronal rim and on the outer aspect. I then excised the skin circumferentially with care taken to avoid damage to the underlying Covarrubias's fascia.  The skin was excised using Bovie electrocautery.  Hemostasis is excellent.  The frenular area was reconstructed with interrupted 4-0 chromic sutures.  Hemostasis was again excellent and was circumferentially we anastomosed with 4-0 chromic sutures.  The meatus was normal.  No damage. The urethra was identified.  The remainder of the procedure went completely unremarkable.  There was extensive preputial adhesions.  Bacitracin ointment was placed followed by a circumferential dressing.  Sponge and needle counts were correct.  He was returned to the recovery room in excellent condition.    Procedure(s):  CIRCUMCISION    Surgeon(s):  Patrice Hung MD    Anesthesia: see anesthesia record    Staff:   Circulator: Montse Frost RN  Scrub Person: Maria R Monge LPN; Celina Ramires    Estimated Blood Loss:  none  Urine Voided: * No values recorded between 5/22/2023 10:00 AM and 5/22/2023 10:29 AM *    Specimens:                ID Type Source Tests Collected by Time   A (Not marked as sent) :  Tissue Foreskin TISSUE EXAM, [facility] ([facility]) Patrice Hung MD 5/22/2023 1014         Drains: None    Findings: Tight phimosis with extensive preputial adhesions    Blood: N/A    Complications: None    Grafts and Implants: None    Patrice Hung MD     Date: 5/22/2023  Time: 10:36 EDT

## 2023-05-22 NOTE — ANESTHESIA PREPROCEDURE EVALUATION
Anesthesia Evaluation     Patient summary reviewed and Nursing notes reviewed   no history of anesthetic complications:  NPO Solid Status: > 8 hours  NPO Liquid Status: > 8 hours           Airway   Mallampati: II  TM distance: >3 FB  Neck ROM: full  Dental - normal exam     Pulmonary - negative pulmonary ROS    breath sounds clear to auscultation  Cardiovascular   Exercise tolerance: good (4-7 METS)    Rhythm: regular  Rate: normal    (+) hypertension, dysrhythmias Atrial Fib, hyperlipidemia,       Neuro/Psych  (+) psychiatric history Anxiety,    GI/Hepatic/Renal/Endo    (+)   diabetes mellitus,     Musculoskeletal     Abdominal     Abdomen: soft.   Substance History - negative use     OB/GYN          Other   arthritis,    history of cancer (prostate) remission                    Anesthesia Plan    ASA 3     general     intravenous induction     Anesthetic plan, risks, benefits, and alternatives have been provided, discussed and informed consent has been obtained with: patient.    Use of blood products discussed with consented to blood products.   Plan discussed with CRNA.        CODE STATUS:

## 2023-05-22 NOTE — ANESTHESIA POSTPROCEDURE EVALUATION
Patient: Miki Jett    Procedure Summary     Date: 05/22/23 Room / Location: [facility] OR 06 /  [facility] OR    Anesthesia Start: 1000 Anesthesia Stop: 1029    Procedure: CIRCUMCISION (Penis) Diagnosis:       Phimosis      (Phimosis [N47.1])    Surgeons: Patrice Hung MD Provider: Forrest Hawley MD    Anesthesia Type: general ASA Status: 3          Anesthesia Type: general    Vitals  Vitals Value Taken Time   /71 05/22/23 1100   Temp 97 °F (36.1 °C) 05/22/23 1030   Pulse 68 05/22/23 1100   Resp 14 05/22/23 1100   SpO2 94 % 05/22/23 1100           Post Anesthesia Care and Evaluation    Patient location during evaluation: PACU  Patient participation: complete - patient participated  Level of consciousness: awake  Pain score: 0  Pain management: adequate    Airway patency: patent  Anesthetic complications: No anesthetic complications  PONV Status: none  Cardiovascular status: hemodynamically stable  Respiratory status: nasal cannula  Hydration status: acceptable

## 2023-05-23 LAB — REF LAB TEST METHOD: NORMAL

## 2023-06-01 ENCOUNTER — OFFICE VISIT (OUTPATIENT)
Dept: UROLOGY | Facility: CLINIC | Age: 73
End: 2023-06-01

## 2023-06-01 VITALS
SYSTOLIC BLOOD PRESSURE: 122 MMHG | BODY MASS INDEX: 33.46 KG/M2 | HEART RATE: 88 BPM | WEIGHT: 239 LBS | DIASTOLIC BLOOD PRESSURE: 75 MMHG | HEIGHT: 71 IN

## 2023-06-01 DIAGNOSIS — N47.1 PHIMOSIS: Primary | ICD-10-CM

## 2023-06-01 NOTE — PROGRESS NOTES
Chief Complaint:      Chief Complaint   Patient presents with   • Post Op Circumcision       HPI:   72 y.o. 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 (Patient taking differently: Take 1 tablet by mouth 2 (Two) Times a Day. ON HOLD   LAST DOSE 5/18/23 PER CARDIOLOGIST) 60 tablet 2   • Blood Glucose Monitoring Suppl (TRUE METRIX METER) w/Device kit      • clotrimazole-betamethasone (Lotrisone) 1-0.05 % cream Apply 1 application topically to the appropriate area as directed 2 (Two) Times a Day. 45 g 2   • EASY COMFORT PEN NEEDLES 31G X 8 MM misc Daily. as directed  0   • folic acid (FOLVITE) 1 MG tablet Take 1 tablet by mouth Daily.     • HYDROcodone-acetaminophen (NORCO)  MG per tablet Take 1 tablet by mouth Every 6 (Six) Hours As Needed for Moderate Pain. 12 tablet 0   • lisinopril (PRINIVIL,ZESTRIL) 5 MG tablet Take 1 tablet by mouth Daily. for blood pressure     • LORazepam (ATIVAN) 1 MG tablet TAKE ONE TABLET BY MOUTH EVERY DAY AS NEEDED ANXIETY AND OR SLEEP     • metFORMIN (GLUCOPHAGE) 500 MG tablet Take 1 tablet by mouth 2 (Two) Times a Day With Meals.     • methotrexate 2.5 MG tablet Take 1 tablet by mouth. Take 8 tablets every Friday     • pantoprazole (PROTONIX) 40 MG EC tablet Take 1 tablet by mouth Daily.  3   • sertraline (ZOLOFT) 100 MG tablet Take 1 tablet by mouth Daily.     • sotalol (BETAPACE) 80 MG tablet Take 1 tablet  by mouth 2 (Two) Times a Day.     • traMADol (ULTRAM) 50 MG tablet Take 1 tablet by mouth As Needed.     • Tresiba FlexTouch 100 UNIT/ML solution pen-injector injection Inject 44 Units under the skin into the appropriate area as directed Daily.     • TRUE METRIX BLOOD GLUCOSE TEST test strip      • TRUEPLUS LANCETS 30G misc        No current facility-administered medications for this visit.        Allergies:      No Known Allergies     Past Surgical History:     Past Surgical History:   Procedure Laterality Date   • CARDIOVASCULAR STRESS TEST     • CATARACT EXTRACTION Bilateral    • CIRCUMCISION N/A 2023    Procedure: CIRCUMCISION;  Surgeon: Patrice Hung MD;  Location: [facility];  Service: Urology;  Laterality: N/A;   • COLONOSCOPY     • ECHO - CONVERTED     • PROSTATECTOMY  2019    at [place]        Social History:     Social History     Socioeconomic History   • Marital status:    Tobacco Use   • Smoking status: Former     Packs/day: 0.50     Years: 3.00     Pack years: 1.50     Types: Cigarettes     Quit date:      Years since quittin.4   • Smokeless tobacco: Former     Types: Chew   Vaping Use   • Vaping Use: Never used   Substance and Sexual Activity   • Alcohol use: No   • Drug use: No   • Sexual activity: Defer     Partners: Female       Family History:     Family History   Problem Relation Age of Onset   • No Known Problems Mother    • Lung cancer Father    • Prostate cancer Brother    • Heart disease Sister        Review of Systems:     Review of Systems   Constitutional: Negative.    HENT: Negative.    Eyes: Negative.    Respiratory: Negative.    Cardiovascular: Negative.    Gastrointestinal: Negative.    Endocrine: Negative.    Musculoskeletal: Negative.    Allergic/Immunologic: Negative.    Neurological: Negative.    Hematological: Negative.    Psychiatric/Behavioral: Negative.        Physical Exam:     Physical Exam  Vitals and nursing note reviewed.    Constitutional:       Appearance: He is well-developed.   HENT:      Head: Normocephalic and atraumatic.   Eyes:      Conjunctiva/sclera: Conjunctivae normal.      Pupils: Pupils are equal, round, and reactive to light.   Cardiovascular:      Rate and Rhythm: Normal rate and regular rhythm.      Heart sounds: Normal heart sounds.   Pulmonary:      Effort: Pulmonary effort is normal.      Breath sounds: Normal breath sounds.   Abdominal:      General: Bowel sounds are normal.      Palpations: Abdomen is soft.   Genitourinary:     Penis: Normal.    Musculoskeletal:         General: Normal range of motion.      Cervical back: Normal range of motion.   Skin:     General: Skin is warm and dry.   Neurological:      Mental Status: He is alert and oriented to person, place, and time.      Deep Tendon Reflexes: Reflexes are normal and symmetric.   Psychiatric:         Behavior: Behavior normal.         Thought Content: Thought content normal.         Judgment: Judgment normal.         I have reviewed the following portions of the patient's history: Allergies, current medications, past family history, past medical history, past social history, past surgical history, problem list, and ROS and confirm it is accurate.    Recent Image (CT and/or KUB):      CT Abdomen and Pelvis: No results found for this or any previous visit.       CT Stone Protocol: No results found for this or any previous visit.       KUB: No results found for this or any previous visit.       Labs (past 3 months):      Admission on 05/22/2023, Discharged on 05/22/2023   Component Date Value Ref Range Status   • Glucose 05/22/2023 135 (H)  70 - 130 mg/dL Final    Meter: [id number] : [id number] Raulito Villalba   • Reference Lab Report 05/22/2023    Final                    Value:[facility]  [address] [place] [address]  Phone: [phone] or [phone]  Fax: [phone]  Kvng Grant M.D., Medical  "Director    PATIENT NAME                           LABORATORY NO.  [id number]  MARLO BARRETT                    [id number]  [id number]                         AGE              SEX  SSN           CLIENT REF #  [facility]              72      1950  KIMBER    [id number]   [id number]    [address]                     REQUESTING M.D.     ATTENDING [place] [address]                   YASHIRA CARNEY  DATE COLLECTED      DATE RECEIVED      DATE REPORTED  2023    DIAGNOSIS:  FORESKIN:  Changes compatible with balanitis xerotica obliterans.  Negative for malignancy.    WJB    CLINICAL HISTORY:  Phimosis    SPECIMENS RECEIVED:  FORESKIN    MICROSCOPIC DESCRIPTION:  Tissue blocks are prepared and slides are examined microscopically on                           all  specimens. See diagnosis for details.    Professional interpretation rendered by Pradeep Osorio D.O. at [facility], [address] [place] [address].    GROSS DESCRIPTION:  Received in formalin labeled \"foreskin\" is a 7.3 x 3.5 x 1.0 cm excision of tan-  gray wrinkled skin with underlying gray-pink edematous soft tissue.  The  surgical margin is inked blue and sectioning reveals no distinct ulcerations,  masses, or nodules.  Representative sections are submitted in 1 block.  [facility]    REVIEWED, DIAGNOSED AND ELECTRONICALLY  SIGNED BY:    Pradeep Osorio D.O.  CPT CODES:  21698     Pre-Admission Testing on 2023   Component Date Value Ref Range Status   • Glucose 2023 144 (H)  65 - 99 mg/dL Final   • BUN 2023 17  8 - 23 mg/dL Final   • Creatinine 2023 0.89  0.76 - 1.27 mg/dL Final   • Sodium 2023 137  136 - 145 mmol/L Final   • Potassium 2023 4.1  3.5 - 5.2 mmol/L Final   • Chloride 2023 102  98 - 107 mmol/L Final   • CO2 2023 23.6  22.0 - 29.0 mmol/L Final   • Calcium 2023 9.6  8.6 - 10.5 mg/dL Final   • " BUN/Creatinine Ratio 05/19/2023 19.1  7.0 - 25.0 Final   • Anion Gap 05/19/2023 11.4  5.0 - 15.0 mmol/L Final   • eGFR 05/19/2023 89.9  >60.0 mL/min/1.73 Final   • WBC 05/19/2023 12.65 (H)  3.40 - 10.80 10*3/mm3 Final   • RBC 05/19/2023 4.40  4.14 - 5.80 10*6/mm3 Final   • Hemoglobin 05/19/2023 13.3  13.0 - 17.7 g/dL Final   • Hematocrit 05/19/2023 41.0  37.5 - 51.0 % Final   • MCV 05/19/2023 93.2  79.0 - 97.0 fL Final   • MCH 05/19/2023 30.2  26.6 - 33.0 pg Final   • MCHC 05/19/2023 32.4  31.5 - 35.7 g/dL Final   • RDW 05/19/2023 16.3 (H)  12.3 - 15.4 % Final   • RDW-SD 05/19/2023 55.1 (H)  37.0 - 54.0 fl Final   • MPV 05/19/2023 9.3  6.0 - 12.0 fL Final   • Platelets 05/19/2023 277  140 - 450 10*3/mm3 Final        Procedure:       Assessment/Plan:   Phimosis-status post circumcision benign pathology reported      This document has been electronically signed by YASHIRA CARNEY MD June 1, 2023 09:04 EDT    Dictated Utilizing Dragon Dictation: Part of this note may be an electronic transcription/translation of spoken language to printed text using the Dragon Dictation System.

## 2023-08-23 NOTE — PROGRESS NOTES
subjective     Chief Complaint   Patient presents with   • Atrial Fibrillation     Follow up, pt denies any symptoms, doing well   • Hypertension     Follow up     History of Present Illness     You have chosen to receive care through a telephone visit. Do you consent to use a telephone visit for your medical care today? Yes    Patient is 70 years old white male who is being evaluated via telephone visit.  Patient has history of paroxysmal atrial fibrillation, currently in sinus rhythm on Betapace therapy.  [id number] 3 on Eliquis 5 mg twice daily without any drug side effects.  No bleeding or bruising.  Blood pressure is very well controlled with lisinopril.  Recently his blood sugar has been high and he has been taking Metformin.  Patient is very active and is totally asymptomatic.  Denies any chest pain.  He still has off-and-on mild palpitations    Past Surgical History:   Procedure Laterality Date   • CARDIOVASCULAR STRESS TEST  2012   • CATARACT EXTRACTION     • ECHO - CONVERTED  2012   • PROSTATECTOMY  08/27/2019    at [place]      Family History   Problem Relation Age of Onset   • No Known Problems Mother    • Lung cancer Father    • Prostate cancer Brother    • Heart disease Sister      Past Medical History:   Diagnosis Date   • Anxiety    • Cancer (CMS/HCC) 10/2018    Prostate cancer   • Elevated PSA    • Encounter for monitoring sotalol therapy    • Hyperlipidemia    • Hypertension    • Obesity    • Paroxysmal A-fib (CMS/HCC)      Patient Active Problem List   Diagnosis   • Paroxysmal a-fib February 2006,currently in sinus rhythm.[id number] 3   • Encounter for monitoring sotalol therapy   • Hypertension   • Hyperlipidemia   • Obesity   • Controlled type 2 diabetes mellitus without complication, without long-term current use of insulin (CMS/HCC)   • Prostate cancer (CMS/HCC)   • Chronic anticoagulation with eliquis       Social History     Tobacco Use   • Smoking status: Former Smoker     Packs/day:  0.50     Years: 3.00     Pack years: 1.50     Types: Cigarettes     Quit date:      Years since quittin.9   • Smokeless tobacco: Former User     Types: Chew   Substance Use Topics   • Alcohol use: No   • Drug use: No       No Known Allergies    Current Outpatient Medications on File Prior to Visit   Medication Sig   • apixaban (ELIQUIS) 5 MG tablet tablet Take 1 tablet by mouth 2 (Two) Times a Day.   • Blood Glucose Monitoring Suppl (TRUE METRIX METER) w/Device kit    • EASY COMFORT PEN NEEDLES 31G X 8 MM misc Daily. as directed   • lisinopril (PRINIVIL,ZESTRIL) 10 MG tablet Take 10 mg by mouth daily.   • LORazepam (ATIVAN) 1 MG tablet    • metFORMIN (GLUCOPHAGE) 500 MG tablet Take 500 mg by mouth 2 (Two) Times a Day With Meals.   • pantoprazole (PROTONIX) 40 MG EC tablet Take 40 mg by mouth Daily.   • sildenafil (REVATIO) 20 MG tablet TAKE ONE TABLET BY MOUTH EVERY NIGHT. BEGIN TAKING EVENING UNGER CATHETER IS REMOVED   • sotalol (BETAPACE) 80 MG tablet Take 80 mg by mouth 2 (two) times a day.   • TRUE METRIX BLOOD GLUCOSE TEST test strip    • TRUEPLUS LANCETS 30G misc      No current facility-administered medications on file prior to visit.          The following portions of the patient's history were reviewed and updated as appropriate: allergies, current medications, past family history, past medical history, past social history, past surgical history and problem list.    Review of Systems   Constitution: Negative.   HENT: Negative.  Negative for congestion.    Eyes: Negative.    Cardiovascular: Negative.  Negative for chest pain, cyanosis, dyspnea on exertion, irregular heartbeat, leg swelling, near-syncope, orthopnea, palpitations, paroxysmal nocturnal dyspnea and syncope.   Respiratory: Negative.  Negative for shortness of breath.    Hematologic/Lymphatic: Negative.    Musculoskeletal: Negative.    Gastrointestinal: Negative.    Neurological: Negative.  Negative for headaches.          Objective:      /72 Comment: verbal per patient  Pulse 78 Comment: verbal per patient  Physical Exam  No physical examination/telephone visit    Lab Review  Lab Results   Component Value Date     03/02/2015    K 4.6 03/02/2015     03/02/2015    BUN 22 (H) 03/02/2015    CREATININE 0.84 03/02/2015    GLUCOSE 126 (H) 03/02/2015    CALCIUM 9.7 03/02/2015    ALT 25 03/02/2015    ALKPHOS 83 03/02/2015    LABIL2 1.3 (L) 03/02/2015     No results found for: CKTOTAL  Lab Results   Component Value Date    WBC 9.3 03/02/2015    HGB 14.4 03/02/2015    HCT 43.9 03/02/2015     03/02/2015     No results found for: INR  Lab Results   Component Value Date    MG 2.2 09/02/2014     Lab Results   Component Value Date    PSA <0.10 10/21/2019    TSH 3.282 03/02/2015     No results found for: BNP  Lab Results   Component Value Date    CHLPL 166 03/02/2015    TRIG 324 (H) 03/02/2015    HDL 38 (L) 03/02/2015    VLDL 65 (H) 03/02/2015     Lab Results   Component Value Date    LDL 63 03/02/2015       Procedures       I personally viewed and interpreted the patient's LAB data         Assessment:     1. Paroxysmal a-fib February 2006,currently in sinus rhythm.[id number] 3    2. Encounter for monitoring sotalol therapy    3. Mixed hyperlipidemia    4. Essential hypertension    5. Class 1 obesity due to excess calories without serious comorbidity with body mass index (BMI) of 33.0 to 33.9 in adult    6. Chronic anticoagulation with eliquis          Plan:     Patient was advised to continue Eliquis 5 mg twice daily.  Patient is not having any side effects.  Betapace 80 mg twice daily was also continued.  Blood pressure is very well controlled with lisinopril.  Healthy lifestyle and weight loss emphasized.  Patient will be reevaluated in 6 months at that time he will have an EKG and hopefully we can do a face-to-face visit at that time.  COVID-19 precautions discussed  This visit has been rescheduled as a phone visit to comply with  patient safety concerns in accordance with CDC recommendations. Total time of discussion was 23 minutes.          No follow-ups on file.   Rinvoq Counseling: I discussed with the patient the risks of Rinvoq therapy including but not limited to upper respiratory tract infections, shingles, cold sores, bronchitis, nausea, cough, fever, acne, and headache. Live vaccines should be avoided.  This medication has been linked to serious infections; higher rate of mortality; malignancy and lymphoproliferative disorders; major adverse cardiovascular events; thrombosis; thrombocytopenia, anemia, and neutropenia; lipid elevations; liver enzyme elevations; and gastrointestinal perforations.

## 2023-11-15 ENCOUNTER — OFFICE VISIT (OUTPATIENT)
Dept: CARDIOLOGY | Facility: CLINIC | Age: 73
End: 2023-11-15
Payer: MEDICARE

## 2023-11-15 VITALS
HEART RATE: 74 BPM | OXYGEN SATURATION: 96 % | WEIGHT: 239 LBS | DIASTOLIC BLOOD PRESSURE: 76 MMHG | BODY MASS INDEX: 33.46 KG/M2 | SYSTOLIC BLOOD PRESSURE: 134 MMHG | HEIGHT: 71 IN

## 2023-11-15 DIAGNOSIS — Z79.899 ENCOUNTER FOR MONITORING SOTALOL THERAPY: ICD-10-CM

## 2023-11-15 DIAGNOSIS — Z51.81 ENCOUNTER FOR MONITORING SOTALOL THERAPY: ICD-10-CM

## 2023-11-15 DIAGNOSIS — Z79.01 CHRONIC ANTICOAGULATION: ICD-10-CM

## 2023-11-15 DIAGNOSIS — I48.19 PERSISTENT ATRIAL FIBRILLATION: Primary | ICD-10-CM

## 2023-11-15 DIAGNOSIS — I10 ESSENTIAL HYPERTENSION: ICD-10-CM

## 2023-11-15 NOTE — LETTER
November 16, 2023     CHINO Ramos  [address]  [place] [address]    Patient: Miki Jett   YOB: 1950   Date of Visit: 11/15/2023       Dear CHINO Ramos    Miki Jett was in my office today. Below is a copy of my note.    If you have questions, please do not hesitate to call me. I look forward to following Miki along with you.         Sincerely,        Mike Olson MD        CC: MD Jonny Wolfe MD    subjective     Chief Complaint   Patient presents with   • Atrial Fibrillation     Follow up       Problems Addressed This Visit  No diagnosis found.    History of Present Illness          Past Surgical History:   Procedure Laterality Date   • CARDIOVASCULAR STRESS TEST  2012   • CATARACT EXTRACTION Bilateral    • CIRCUMCISION N/A 5/22/2023    Procedure: CIRCUMCISION;  Surgeon: Patrice Hung MD;  Location: [facility];  Service: Urology;  Laterality: N/A;   • COLONOSCOPY     • ECHO - CONVERTED  2012   • PROSTATECTOMY  08/27/2019    at [place]      Family History   Problem Relation Age of Onset   • No Known Problems Mother    • Lung cancer Father    • Prostate cancer Brother    • Heart disease Sister      Past Medical History:   Diagnosis Date   • Anxiety    • Arthritis    • Cancer 10/2018    Prostate cancer   • Diabetes mellitus    • Elevated PSA    • Encounter for monitoring sotalol therapy    • [place] (hard of hearing)    • Hyperlipidemia    • Hyperlipidemia    • Hypertension    • Obesity    • Paroxysmal A-fib    • Phimosis      Patient Active Problem List   Diagnosis   • Persistent atrial fibrillation   • Encounter for monitoring sotalol therapy   • Hypertension   • Hyperlipidemia   • Obesity   • Controlled type 2 diabetes mellitus without complication, without long-term current use of insulin   • Prostate cancer   • Chronic anticoagulation with eliquis   • Multiple risk factors for coronary artery disease   •  Phimosis   • Preoperative clearance       Social History     Tobacco Use   • Smoking status: Former     Packs/day: 0.50     Years: 3.00     Additional pack years: 0.00     Total pack years: 1.50     Types: Cigarettes     Quit date:      Years since quittin.8   • Smokeless tobacco: Former     Types: Chew   Vaping Use   • Vaping Use: Never used   Substance Use Topics   • Alcohol use: No   • Drug use: No       No Known Allergies    Current Outpatient Medications on File Prior to Visit   Medication Sig   • apixaban (ELIQUIS) 5 MG tablet tablet Take 1 tablet by mouth 2 (Two) Times a Day. (Patient taking differently: Take 1 tablet by mouth Every 12 (Twelve) Hours.  )   • Blood Glucose Monitoring Suppl (TRUE METRIX METER) w/Device kit    • clotrimazole-betamethasone (Lotrisone) 1-0.05 % cream Apply 1 application topically to the appropriate area as directed 2 (Two) Times a Day.   • EASY COMFORT PEN NEEDLES 31G X 8 MM misc Daily. as directed   • folic acid (FOLVITE) 1 MG tablet Take 1 tablet by mouth Daily.   • lisinopril (PRINIVIL,ZESTRIL) 5 MG tablet Take 1 tablet by mouth Daily. for blood pressure   • LORazepam (ATIVAN) 1 MG tablet TAKE ONE TABLET BY MOUTH EVERY DAY AS NEEDED ANXIETY AND OR SLEEP   • metFORMIN (GLUCOPHAGE) 500 MG tablet Take 1 tablet by mouth 2 (Two) Times a Day With Meals.   • methotrexate 2.5 MG tablet Take 1 tablet by mouth. Take 8 tablets every Friday   • pantoprazole (PROTONIX) 40 MG EC tablet Take 1 tablet by mouth Daily.   • sertraline (ZOLOFT) 100 MG tablet Take 1 tablet by mouth Daily.   • sotalol (BETAPACE) 80 MG tablet Take 1 tablet by mouth 2 (Two) Times a Day.   • Tresiba FlexTouch 100 UNIT/ML solution pen-injector injection Inject 44 Units under the skin into the appropriate area as directed Daily.   • TRUE METRIX BLOOD GLUCOSE TEST test strip    • TRUEPLUS LANCETS 30G misc    • HYDROcodone-acetaminophen (NORCO)  MG per tablet Take 1 tablet by mouth Every 6 (Six) Hours As  "Needed for Moderate Pain. (Patient not taking: Reported on 11/15/2023)   • traMADol (ULTRAM) 50 MG tablet Take 1 tablet by mouth As Needed. (Patient not taking: Reported on 11/15/2023)     No current facility-administered medications on file prior to visit.         The following portions of the patient's history were reviewed and updated as appropriate: allergies, current medications, past family history, past medical history, past social history, past surgical history and problem list.    ROS       Objective:     /76 (BP Location: Left arm, Patient Position: Sitting, Cuff Size: Adult)   Pulse 74   Ht 180.3 cm (71\")   Wt 108 kg (239 lb)   SpO2 96%   BMI 33.33 kg/m²   Physical Exam      Lab Review  Lab Results   Component Value Date     05/19/2023    K 4.1 05/19/2023     05/19/2023    BUN 17 05/19/2023    CREATININE 0.89 05/19/2023    GLUCOSE 144 (H) 05/19/2023     (H) 08/02/2019    CALCIUM 9.6 05/19/2023    ALT 25 03/02/2015    ALKPHOS 83 03/02/2015    LABIL2 1.3 (L) 03/02/2015     No results found for: \"CKTOTAL\"  Lab Results   Component Value Date    WBC 12.65 (H) 05/19/2023    HGB 13.3 05/19/2023    HCT 41.0 05/19/2023     05/19/2023     No results found for: \"INR\"  Lab Results   Component Value Date    MG 2.2 09/02/2014     Lab Results   Component Value Date    PSA <0.10 10/21/2019    TSH 3.282 03/02/2015     No results found for: \"BNP\"  Lab Results   Component Value Date    CHLPL 166 03/02/2015    TRIG 324 (H) 03/02/2015    HDL 38 (L) 03/02/2015    VLDL 65 (H) 03/02/2015    LDL 63 03/02/2015     Lab Results   Component Value Date    LDL 63 03/02/2015     No results found for: \"PROBNP\"            Procedures       I personally viewed and interpreted the patient's LAB data         Assessment:   No diagnosis found.      Plan:              No follow-ups on file.    "

## 2023-11-15 NOTE — PROGRESS NOTES
subjective     Chief Complaint   Patient presents with    Atrial Fibrillation     Follow up       Problems Addressed This Visit  1. Persistent atrial fibrillation    2. Chronic anticoagulation with eliquis    3. Essential hypertension    4. Encounter for monitoring sotalol therapy        History of Present Illness    Miki is 73 years old white male who is here for cardiology follow-up.  Patient has multiple risk factors for coronary artery disease including Wt, diabetes mellitus, hypertension  He is following closely with his PCP and is doing very well.    Persistent atrial fibrillation  Maintaining sinus rhythm on sotalol therapy.  No drug side effects.    Chronic anticoagulation with Eliquis 5 mg twice daily no abnormal bleeding.    Hypertension is very well controlled with lisinopril    Patient recently had circumcision surgery and is doing well he has history of prostate cancer, diabetes mellitus and rheumatoid arthritis      Past Surgical History:   Procedure Laterality Date    CARDIOVASCULAR STRESS TEST  2012    CATARACT EXTRACTION Bilateral     CIRCUMCISION N/A 5/22/2023    Procedure: CIRCUMCISION;  Surgeon: Patrice Hung MD;  Location: [facility];  Service: Urology;  Laterality: N/A;    COLONOSCOPY      ECHO - CONVERTED  2012    PROSTATECTOMY  08/27/2019    at [place]      Family History   Problem Relation Age of Onset    No Known Problems Mother     Lung cancer Father     Prostate cancer Brother     Heart disease Sister      Past Medical History:   Diagnosis Date    Anxiety     Arthritis     Cancer 10/2018    Prostate cancer    Diabetes mellitus     Elevated PSA     Encounter for monitoring sotalol therapy     [place] (hard of hearing)     Hyperlipidemia     Hyperlipidemia     Hypertension     Obesity     Paroxysmal A-fib     Phimosis      Patient Active Problem List   Diagnosis    Persistent atrial fibrillation    Encounter for monitoring sotalol therapy    Essential hypertension     Hyperlipidemia    Obesity    Controlled type 2 diabetes mellitus without complication, without long-term current use of insulin    Prostate cancer    Chronic anticoagulation with eliquis    Multiple risk factors for coronary artery disease    Phimosis    Preoperative clearance       Social History     Tobacco Use    Smoking status: Former     Packs/day: 0.50     Years: 3.00     Additional pack years: 0.00     Total pack years: 1.50     Types: Cigarettes     Quit date:      Years since quittin.8    Smokeless tobacco: Former     Types: Chew   Vaping Use    Vaping Use: Never used   Substance Use Topics    Alcohol use: No    Drug use: No       No Known Allergies    Current Outpatient Medications on File Prior to Visit   Medication Sig    apixaban (ELIQUIS) 5 MG tablet tablet Take 1 tablet by mouth 2 (Two) Times a Day. (Patient taking differently: Take 1 tablet by mouth Every 12 (Twelve) Hours.  )    Blood Glucose Monitoring Suppl (TRUE METRIX METER) w/Device kit     clotrimazole-betamethasone (Lotrisone) 1-0.05 % cream Apply 1 application topically to the appropriate area as directed 2 (Two) Times a Day.    EASY COMFORT PEN NEEDLES 31G X 8 MM misc Daily. as directed    folic acid (FOLVITE) 1 MG tablet Take 1 tablet by mouth Daily.    lisinopril (PRINIVIL,ZESTRIL) 5 MG tablet Take 1 tablet by mouth Daily. for blood pressure    LORazepam (ATIVAN) 1 MG tablet TAKE ONE TABLET BY MOUTH EVERY DAY AS NEEDED ANXIETY AND OR SLEEP    metFORMIN (GLUCOPHAGE) 500 MG tablet Take 1 tablet by mouth 2 (Two) Times a Day With Meals.    methotrexate 2.5 MG tablet Take 1 tablet by mouth. Take 8 tablets every Friday    pantoprazole (PROTONIX) 40 MG EC tablet Take 1 tablet by mouth Daily.    sertraline (ZOLOFT) 100 MG tablet Take 1 tablet by mouth Daily.    sotalol (BETAPACE) 80 MG tablet Take 1 tablet by mouth 2 (Two) Times a Day.    Tresiba FlexTouch 100 UNIT/ML solution pen-injector injection Inject 44 Units under the skin into  "the appropriate area as directed Daily.    TRUE METRIX BLOOD GLUCOSE TEST test strip     TRUEPLUS LANCETS 30G misc      No current facility-administered medications on file prior to visit.         The following portions of the patient's history were reviewed and updated as appropriate: allergies, current medications, past family history, past medical history, past social history, past surgical history and problem list.    Review of Systems   Constitutional: Negative.   HENT: Negative.  Negative for congestion.    Eyes: Negative.    Cardiovascular: Negative.  Negative for chest pain, cyanosis, dyspnea on exertion, irregular heartbeat, leg swelling, near-syncope, orthopnea, palpitations, paroxysmal nocturnal dyspnea and syncope.   Respiratory: Negative.  Negative for shortness of breath.    Hematologic/Lymphatic: Negative.    Musculoskeletal: Negative.    Gastrointestinal: Negative.    Neurological: Negative.  Negative for headaches.          Objective:     /76 (BP Location: Left arm, Patient Position: Sitting, Cuff Size: Adult)   Pulse 74   Ht 180.3 cm (71\")   Wt 108 kg (239 lb)   SpO2 96%   BMI 33.33 kg/m²   Pulmonary:      Effort: Pulmonary effort is normal.      Breath sounds: Normal breath sounds. No stridor. No wheezing. No rhonchi. No rales.   Cardiovascular:      PMI at left midclavicular line. Normal rate. Regular rhythm. Normal S1. Normal S2.       Murmurs: There is no murmur.      No gallop.  No click. No rub.   Pulses:     Intact distal pulses.   Edema:     Peripheral edema absent.           Lab Review  Lab Results   Component Value Date     05/19/2023    K 4.1 05/19/2023     05/19/2023    BUN 17 05/19/2023    CREATININE 0.89 05/19/2023    GLUCOSE 144 (H) 05/19/2023     (H) 08/02/2019    CALCIUM 9.6 05/19/2023    ALT 25 03/02/2015    ALKPHOS 83 03/02/2015    LABIL2 1.3 (L) 03/02/2015     No results found for: \"CKTOTAL\"  Lab Results   Component Value Date    WBC 12.65 (H) " 05/19/2023    HGB 13.3 05/19/2023    HCT 41.0 05/19/2023     05/19/2023                 Procedures       I personally viewed and interpreted the patient's LAB data         Assessment:     1. Persistent atrial fibrillation    2. Chronic anticoagulation with eliquis    3. Essential hypertension    4. Encounter for monitoring sotalol therapy          Plan:     Persistent atrial fibrillation  Patient is maintaining sinus rhythm he was advised to continue sotalol.  No drug side effects.  We will check EKG next visit for QT monitoring.  Renal functions have been normal last lab work 6 months ago.  Patient states that he had lab work recently with his PCP we will get a copy of his lipid panel and CMP.  His blood sugar at home today was 130.  Chronic anticoagulation with Eliquis was continued    Hypertension is also very well controlled.    weight loss was emphasized.  Healthy lifestyle discussed.   Follow-up in 6 months with EKG            No follow-ups on file.

## 2024-02-29 ENCOUNTER — TELEPHONE (OUTPATIENT)
Dept: CARDIOLOGY | Facility: CLINIC | Age: 74
End: 2024-02-29
Payer: MEDICARE

## 2024-05-15 ENCOUNTER — OFFICE VISIT (OUTPATIENT)
Dept: CARDIOLOGY | Facility: CLINIC | Age: 74
End: 2024-05-15
Payer: MEDICARE

## 2024-05-15 VITALS
DIASTOLIC BLOOD PRESSURE: 74 MMHG | OXYGEN SATURATION: 95 % | HEART RATE: 63 BPM | BODY MASS INDEX: 33.6 KG/M2 | WEIGHT: 240 LBS | HEIGHT: 71 IN | SYSTOLIC BLOOD PRESSURE: 122 MMHG

## 2024-05-15 DIAGNOSIS — Z79.01 CHRONIC ANTICOAGULATION: ICD-10-CM

## 2024-05-15 DIAGNOSIS — E66.09 CLASS 1 OBESITY DUE TO EXCESS CALORIES WITHOUT SERIOUS COMORBIDITY WITH BODY MASS INDEX (BMI) OF 33.0 TO 33.9 IN ADULT: ICD-10-CM

## 2024-05-15 DIAGNOSIS — I48.19 PERSISTENT ATRIAL FIBRILLATION: Primary | ICD-10-CM

## 2024-05-15 DIAGNOSIS — E78.2 MIXED HYPERLIPIDEMIA: ICD-10-CM

## 2024-05-15 DIAGNOSIS — I10 ESSENTIAL HYPERTENSION: ICD-10-CM

## 2024-05-15 RX ORDER — FENOFIBRATE 145 MG/1
1 TABLET, COATED ORAL DAILY
COMMUNITY

## 2024-05-15 NOTE — LETTER
May 15, 2024     CHINO Ramos  [address] [place] [address]    Patient: Miki Jett   YOB: 1950   Date of Visit: 5/15/2024       Dear CHINO Ramos    Miki Jett was in my office today. Below is a copy of my note.    If you have questions, please do not hesitate to call me. I look forward to following Miki along with you.         Sincerely,        Mike Olson MD        CC: MD Jonny Wolfe MD    subjective     Chief Complaint   Patient presents with   • Atrial Fibrillation     Follow up       Problems Addressed This Visit  1. Persistent atrial fibrillation    2. Mixed hyperlipidemia    3. Essential hypertension    4. Chronic anticoagulation with eliquis        History of Present Illness    Miki is 73 years old white male who is here for cardiology follow-up.  Patient has history of persistent atrial fibrillation, maintaining sinus rhythm on Betapace therapy.  He is taking Betapace 80 mg twice a day.  He is also anticoagulated with Eliquis 5 twice daily.  No abnormal bleeding.    Hypertension  Blood pressure is very well-controlled.  He is taking lisinopril 5 mg daily.    Hyperlipidemia  Primarily hypertriglyceridemia secondary to obesity and diabetes mellitus.  He is taking Tricor and also on metformin and Tresiba.  He is following closely with his PCP  He denies any chest pain palpitations or shortness of breath.  He is overweight and has not lost any weight.    Past Surgical History:   Procedure Laterality Date   • CARDIOVASCULAR STRESS TEST  2012   • CATARACT EXTRACTION Bilateral    • CIRCUMCISION N/A 5/22/2023    Procedure: CIRCUMCISION;  Surgeon: Patrice Hung MD;  Location: [facility];  Service: Urology;  Laterality: N/A;   • COLONOSCOPY     • ECHO - CONVERTED  2012   • PROSTATECTOMY  08/27/2019    at [facility]      Family History   Problem Relation Age of Onset   • No Known Problems Mother    • Lung  cancer Father    • Prostate cancer Brother    • Heart disease Sister      Past Medical History:   Diagnosis Date   • Anxiety    • Arthritis    • Cancer 10/2018    Prostate cancer   • Diabetes mellitus    • Elevated PSA    • Encounter for monitoring sotalol therapy    • [place] (hard of hearing)    • Hyperlipidemia    • Hyperlipidemia    • Hypertension    • Obesity    • Paroxysmal A-fib    • Phimosis      Patient Active Problem List   Diagnosis   • Persistent atrial fibrillation   • Encounter for monitoring sotalol therapy   • Essential hypertension   • Hyperlipidemia   • Obesity   • Controlled type 2 diabetes mellitus without complication, without long-term current use of insulin   • Prostate cancer   • Chronic anticoagulation with eliquis   • Multiple risk factors for coronary artery disease   • Phimosis   • Preoperative clearance       Social History     Tobacco Use   • Smoking status: Former     Current packs/day: 0.00     Average packs/day: 0.5 packs/day for 3.0 years (1.5 ttl pk-yrs)     Types: Cigarettes     Start date:      Quit date:      Years since quittin.3   • Smokeless tobacco: Former     Types: Chew   Vaping Use   • Vaping status: Never Used   Substance Use Topics   • Alcohol use: No   • Drug use: No       No Known Allergies    Current Outpatient Medications on File Prior to Visit   Medication Sig   • apixaban (ELIQUIS) 5 MG tablet tablet Take 1 tablet by mouth 2 (Two) Times a Day. (Patient taking differently: Take 1 tablet by mouth Every 12 (Twelve) Hours.  )   • Blood Glucose Monitoring Suppl (TRUE METRIX METER) w/Device kit    • clotrimazole-betamethasone (Lotrisone) 1-0.05 % cream Apply 1 application topically to the appropriate area as directed 2 (Two) Times a Day.   • EASY COMFORT PEN NEEDLES 31G X 8 MM misc Daily. as directed   • fenofibrate (TRICOR) 145 MG tablet Take 1 tablet by mouth Daily.   • folic acid (FOLVITE) 1 MG tablet Take 1 tablet by mouth Daily.   • lisinopril  "(PRINIVIL,ZESTRIL) 5 MG tablet Take 1 tablet by mouth Daily. for blood pressure   • LORazepam (ATIVAN) 1 MG tablet TAKE ONE TABLET BY MOUTH EVERY DAY AS NEEDED ANXIETY AND OR SLEEP   • metFORMIN (GLUCOPHAGE) 500 MG tablet Take 1 tablet by mouth 2 (Two) Times a Day With Meals.   • methotrexate 2.5 MG tablet Take 1 tablet by mouth. Take 8 tablets every Friday   • pantoprazole (PROTONIX) 40 MG EC tablet Take 1 tablet by mouth Daily.   • sertraline (ZOLOFT) 100 MG tablet Take 1 tablet by mouth Daily.   • sotalol (BETAPACE) 80 MG tablet Take 1 tablet by mouth 2 (Two) Times a Day.   • Tresiba FlexTouch 100 UNIT/ML solution pen-injector injection Inject 44 Units under the skin into the appropriate area as directed Daily.   • TRUE METRIX BLOOD GLUCOSE TEST test strip    • TRUEPLUS LANCETS 30G misc      No current facility-administered medications on file prior to visit.         The following portions of the patient's history were reviewed and updated as appropriate: allergies, current medications, past family history, past medical history, past social history, past surgical history and problem list.    Review of Systems   Constitutional: Negative.   HENT: Negative.  Negative for congestion.    Eyes: Negative.    Cardiovascular: Negative.  Negative for chest pain, cyanosis, dyspnea on exertion, irregular heartbeat, leg swelling, near-syncope, orthopnea, palpitations, paroxysmal nocturnal dyspnea and syncope.   Respiratory: Negative.  Negative for shortness of breath.    Hematologic/Lymphatic: Negative.    Musculoskeletal: Negative.    Gastrointestinal: Negative.    Neurological: Negative.  Negative for headaches.          Objective:     /74 (BP Location: Left arm, Patient Position: Sitting, Cuff Size: Adult)   Pulse 63   Ht 180.3 cm (71\")   Wt 109 kg (240 lb)   SpO2 95%   BMI 33.47 kg/m²   Pulmonary:      Effort: Pulmonary effort is normal.      Breath sounds: Normal breath sounds. No stridor. No wheezing. No " rhonchi. No rales.   Cardiovascular:      PMI at left midclavicular line. Normal rate. Regular rhythm. Normal S1. Normal S2.       Murmurs: There is no murmur.      No gallop.  No click. No rub.   Pulses:     Intact distal pulses.   Edema:     Peripheral edema absent.           Lab Review    Copy of lab from PCP requested            Procedures       I personally viewed and interpreted the patient's LAB data         Assessment:     1. Persistent atrial fibrillation    2. Mixed hyperlipidemia    3. Essential hypertension    4. Chronic anticoagulation with eliquis          Plan:     Persistent atrial fibrillation  Patient is maintaining sinus rhythm  He was advised to continue Betapace.  Eliquis 5 twice daily was continued.  Lab work from PCP requested.    Hypertension  Blood pressure is very well-controlled he will continue lisinopril.    Hyperlipidemia and diabetes mellitus.  He is following closely with his PCP and is taking his medications regularly however he has not lost any weight.  Healthy lifestyle exercise and weight loss emphasized.    EKG does not show any acute changes.    Thank you for giving me the oppertunity to participate in your patient's cardiac care.    Sincerely,    ANTON Olson M.D. FACP [facility]          No follow-ups on file.

## 2024-05-15 NOTE — PROGRESS NOTES
subjective     Chief Complaint   Patient presents with    Atrial Fibrillation     Follow up       Problems Addressed This Visit  1. Persistent atrial fibrillation    2. Mixed hyperlipidemia    3. Essential hypertension    4. Chronic anticoagulation with eliquis        History of Present Illness    Miki is 73 years old white male who is here for cardiology follow-up.  Patient has history of persistent atrial fibrillation, maintaining sinus rhythm on Betapace therapy.  He is taking Betapace 80 mg twice a day.  He is also anticoagulated with Eliquis 5 twice daily.  No abnormal bleeding.    Hypertension  Blood pressure is very well-controlled.  He is taking lisinopril 5 mg daily.    Hyperlipidemia  Primarily hypertriglyceridemia secondary to obesity and diabetes mellitus.  He is taking Tricor and also on metformin and Tresiba.  He is following closely with his PCP  He denies any chest pain palpitations or shortness of breath.  He is overweight and has not lost any weight.    Past Surgical History:   Procedure Laterality Date    CARDIOVASCULAR STRESS TEST  2012    CATARACT EXTRACTION Bilateral     CIRCUMCISION N/A 5/22/2023    Procedure: CIRCUMCISION;  Surgeon: Patrice Hung MD;  Location: [facility];  Service: Urology;  Laterality: N/A;    COLONOSCOPY      ECHO - CONVERTED  2012    PROSTATECTOMY  08/27/2019    at [facility]      Family History   Problem Relation Age of Onset    No Known Problems Mother     Lung cancer Father     Prostate cancer Brother     Heart disease Sister      Past Medical History:   Diagnosis Date    Anxiety     Arthritis     Cancer 10/2018    Prostate cancer    Diabetes mellitus     Elevated PSA     Encounter for monitoring sotalol therapy     [place] (hard of hearing)     Hyperlipidemia     Hyperlipidemia     Hypertension     Obesity     Paroxysmal A-fib     Phimosis      Patient Active Problem List   Diagnosis    Persistent atrial fibrillation    Encounter for monitoring sotalol therapy     Essential hypertension    Hyperlipidemia    Obesity    Controlled type 2 diabetes mellitus without complication, without long-term current use of insulin    Prostate cancer    Chronic anticoagulation with eliquis    Multiple risk factors for coronary artery disease    Phimosis    Preoperative clearance       Social History     Tobacco Use    Smoking status: Former     Current packs/day: 0.00     Average packs/day: 0.5 packs/day for 3.0 years (1.5 ttl pk-yrs)     Types: Cigarettes     Start date:      Quit date:      Years since quittin.3    Smokeless tobacco: Former     Types: Chew   Vaping Use    Vaping status: Never Used   Substance Use Topics    Alcohol use: No    Drug use: No       No Known Allergies    Current Outpatient Medications on File Prior to Visit   Medication Sig    apixaban (ELIQUIS) 5 MG tablet tablet Take 1 tablet by mouth 2 (Two) Times a Day. (Patient taking differently: Take 1 tablet by mouth Every 12 (Twelve) Hours.  )    Blood Glucose Monitoring Suppl (TRUE METRIX METER) w/Device kit     clotrimazole-betamethasone (Lotrisone) 1-0.05 % cream Apply 1 application topically to the appropriate area as directed 2 (Two) Times a Day.    EASY COMFORT PEN NEEDLES 31G X 8 MM misc Daily. as directed    fenofibrate (TRICOR) 145 MG tablet Take 1 tablet by mouth Daily.    folic acid (FOLVITE) 1 MG tablet Take 1 tablet by mouth Daily.    lisinopril (PRINIVIL,ZESTRIL) 5 MG tablet Take 1 tablet by mouth Daily. for blood pressure    LORazepam (ATIVAN) 1 MG tablet TAKE ONE TABLET BY MOUTH EVERY DAY AS NEEDED ANXIETY AND OR SLEEP    metFORMIN (GLUCOPHAGE) 500 MG tablet Take 1 tablet by mouth 2 (Two) Times a Day With Meals.    methotrexate 2.5 MG tablet Take 1 tablet by mouth. Take 8 tablets every Friday    pantoprazole (PROTONIX) 40 MG EC tablet Take 1 tablet by mouth Daily.    sertraline (ZOLOFT) 100 MG tablet Take 1 tablet by mouth Daily.    sotalol (BETAPACE) 80 MG tablet Take 1 tablet by mouth 2  "(Two) Times a Day.    Tresiba FlexTouch 100 UNIT/ML solution pen-injector injection Inject 44 Units under the skin into the appropriate area as directed Daily.    TRUE METRIX BLOOD GLUCOSE TEST test strip     TRUEPLUS LANCETS 30G misc      No current facility-administered medications on file prior to visit.         The following portions of the patient's history were reviewed and updated as appropriate: allergies, current medications, past family history, past medical history, past social history, past surgical history and problem list.    Review of Systems   Constitutional: Negative.   HENT: Negative.  Negative for congestion.    Eyes: Negative.    Cardiovascular: Negative.  Negative for chest pain, cyanosis, dyspnea on exertion, irregular heartbeat, leg swelling, near-syncope, orthopnea, palpitations, paroxysmal nocturnal dyspnea and syncope.   Respiratory: Negative.  Negative for shortness of breath.    Hematologic/Lymphatic: Negative.    Musculoskeletal: Negative.    Gastrointestinal: Negative.    Neurological: Negative.  Negative for headaches.          Objective:     /74 (BP Location: Left arm, Patient Position: Sitting, Cuff Size: Adult)   Pulse 63   Ht 180.3 cm (71\")   Wt 109 kg (240 lb)   SpO2 95%   BMI 33.47 kg/m²   Pulmonary:      Effort: Pulmonary effort is normal.      Breath sounds: Normal breath sounds. No stridor. No wheezing. No rhonchi. No rales.   Cardiovascular:      PMI at left midclavicular line. Normal rate. Regular rhythm. Normal S1. Normal S2.       Murmurs: There is no murmur.      No gallop.  No click. No rub.   Pulses:     Intact distal pulses.   Edema:     Peripheral edema absent.           Lab Review    Copy of lab from PCP requested              ECG 12 Lead    Date/Time: 5/15/2024 11:06 AM  Performed by: Mike Olson MD    Authorized by: Mike Olson MD  Comparison: compared with previous ECG from 5/15/2023  Similar to previous ECG  Rhythm: sinus " rhythm  Rate: normal  BPM: 63  Conduction: conduction normal  ST Segments: ST segments normal  T Waves: T waves normal  QRS axis: normal  Other: no other findings    Clinical impression: normal ECG  Comments: QTc 448             I personally viewed and interpreted the patient's LAB data         Assessment:     1. Persistent atrial fibrillation    2. Mixed hyperlipidemia    3. Essential hypertension    4. Chronic anticoagulation with eliquis          Plan:     Persistent atrial fibrillation  Patient is maintaining sinus rhythm  He was advised to continue Betapace.  Eliquis 5 twice daily was continued.  Lab work from PCP requested.    Hypertension  Blood pressure is very well-controlled he will continue lisinopril.    Hyperlipidemia and diabetes mellitus.  He is following closely with his PCP and is taking his medications regularly however he has not lost any weight.  Healthy lifestyle exercise and weight loss emphasized.    EKG does not show any acute changes.    Thank you for giving me the oppertunity to participate in your patient's cardiac care.    Sincerely,    ANTON Olson M.D. FACP [facility]          No follow-ups on file.

## 2024-11-13 ENCOUNTER — OFFICE VISIT (OUTPATIENT)
Dept: CARDIOLOGY | Facility: CLINIC | Age: 74
End: 2024-11-13
Payer: MEDICARE

## 2024-11-13 VITALS
HEART RATE: 69 BPM | DIASTOLIC BLOOD PRESSURE: 60 MMHG | SYSTOLIC BLOOD PRESSURE: 104 MMHG | WEIGHT: 225 LBS | OXYGEN SATURATION: 98 % | HEIGHT: 71 IN | BODY MASS INDEX: 31.5 KG/M2

## 2024-11-13 DIAGNOSIS — Z79.01 CHRONIC ANTICOAGULATION: ICD-10-CM

## 2024-11-13 DIAGNOSIS — Z51.81 ENCOUNTER FOR MONITORING SOTALOL THERAPY: ICD-10-CM

## 2024-11-13 DIAGNOSIS — G89.29 CHRONIC CHEST PAIN WITH LOW TO MODERATE RISK FOR CAD: Primary | ICD-10-CM

## 2024-11-13 DIAGNOSIS — E78.2 MIXED HYPERLIPIDEMIA: ICD-10-CM

## 2024-11-13 DIAGNOSIS — Z91.89 CHRONIC CHEST PAIN WITH LOW TO MODERATE RISK FOR CAD: Primary | ICD-10-CM

## 2024-11-13 DIAGNOSIS — R07.9 CHRONIC CHEST PAIN WITH LOW TO MODERATE RISK FOR CAD: Primary | ICD-10-CM

## 2024-11-13 DIAGNOSIS — I48.19 PERSISTENT ATRIAL FIBRILLATION: ICD-10-CM

## 2024-11-13 DIAGNOSIS — Z79.899 ENCOUNTER FOR MONITORING SOTALOL THERAPY: ICD-10-CM

## 2024-11-13 NOTE — LETTER
November 16, 2024     CHINO Ramos  [address] [place] [address]    Patient: Miki Jett   YOB: 1950   Date of Visit: 11/13/2024     Dear CHINO Ramos:       Thank you for referring Miki Jett to me for evaluation. Below are the relevant portions of my assessment and plan of care.    If you have questions, please do not hesitate to call me. I look forward to following Miki along with you.         Sincerely,        Mike Olson MD        CC: No Recipients    Mike Olson MD  11/16/24 1212  Signed  subjective     Chief Complaint   Patient presents with   • Atrial Fibrillation     Follow up        Problems Addressed This Visit  1. Chronic chest pain with low to moderate risk for CAD    2. Persistent atrial fibrillation    3. Chronic anticoagulation with eliquis    4. Encounter for monitoring sotalol therapy    5. Mixed hyperlipidemia        History of Present Illness    Miki is 74 years old white male who is here for cardiology follow-up.  Patient has history of persistent atrial fibrillation.  He is maintaining sinus rhythm on sotalol 80 mg twice a day.  No drug side effects.  He is anticoagulant with Eliquis 5 mg every 12 hours.  No abnormal bleeding.    Hyperlipidemia  He is taking Tricor through his PCP.  He is significantly overweight and is trying to lose weight.  He is also diabetic  Will get a copy of lab work for our records.    His blood pressure has been running low.  He is taking lisinopril 5 mg daily.  No syncope or presyncope.    He complains of mild chest pain in substernal area which is not particular related to exertion.  He has multiple risk factors for coronary artery disease.  Last stress test was 3 and half years ago.    Past Surgical History:   Procedure Laterality Date   • CARDIOVASCULAR STRESS TEST  2012   • CATARACT EXTRACTION Bilateral    • CIRCUMCISION N/A 5/22/2023    Procedure: CIRCUMCISION;  Surgeon:  Patrice Hung MD;  Location: [facility];  Service: Urology;  Laterality: N/A;   • COLONOSCOPY     • ECHO - CONVERTED     • PROSTATECTOMY  2019    at [facility]      Family History   Problem Relation Age of Onset   • No Known Problems Mother    • Lung cancer Father    • Prostate cancer Brother    • Heart disease Sister      Past Medical History:   Diagnosis Date   • Anxiety    • Arthritis    • Cancer 10/2018    Prostate cancer   • Diabetes mellitus    • Elevated PSA    • Encounter for monitoring sotalol therapy    • [place] (hard of hearing)    • Hyperlipidemia    • Hyperlipidemia    • Hypertension    • Obesity    • Paroxysmal A-fib    • Phimosis      Patient Active Problem List   Diagnosis   • Persistent atrial fibrillation   • Encounter for monitoring sotalol therapy   • Essential hypertension   • Hyperlipidemia   • Obesity   • Controlled type 2 diabetes mellitus without complication, without long-term current use of insulin   • Prostate cancer   • Chronic anticoagulation with eliquis   • Multiple risk factors for coronary artery disease   • Phimosis   • Preoperative clearance       Social History     Tobacco Use   • Smoking status: Former     Current packs/day: 0.00     Average packs/day: 0.5 packs/day for 3.0 years (1.5 ttl pk-yrs)     Types: Cigarettes     Start date:      Quit date: 2006     Years since quittin.8   • Smokeless tobacco: Former     Types: Chew   Vaping Use   • Vaping status: Never Used   Substance Use Topics   • Alcohol use: No   • Drug use: No       No Known Allergies    Current Outpatient Medications on File Prior to Visit   Medication Sig   • apixaban (ELIQUIS) 5 MG tablet tablet Take 1 tablet by mouth 2 (Two) Times a Day. (Patient taking differently: Take 1 tablet by mouth Every 12 (Twelve) Hours.  )   • Blood Glucose Monitoring Suppl (TRUE METRIX METER) w/Device kit    • clotrimazole-betamethasone (Lotrisone) 1-0.05 % cream Apply 1 application topically to the  appropriate area as directed 2 (Two) Times a Day.   • EASY COMFORT PEN NEEDLES 31G X 8 MM misc Daily. as directed   • fenofibrate (TRICOR) 145 MG tablet Take 1 tablet by mouth Daily.   • folic acid (FOLVITE) 1 MG tablet Take 1 tablet by mouth Daily.   • lisinopril (PRINIVIL,ZESTRIL) 5 MG tablet Take 1 tablet by mouth Daily. for blood pressure   • LORazepam (ATIVAN) 1 MG tablet TAKE ONE TABLET BY MOUTH EVERY DAY AS NEEDED ANXIETY AND OR SLEEP   • metFORMIN (GLUCOPHAGE) 500 MG tablet Take 1 tablet by mouth 2 (Two) Times a Day With Meals.   • methotrexate 2.5 MG tablet Take 1 tablet by mouth. Take 8 tablets every Friday   • pantoprazole (PROTONIX) 40 MG EC tablet Take 1 tablet by mouth Daily.   • sertraline (ZOLOFT) 100 MG tablet Take 1 tablet by mouth Daily.   • sotalol (BETAPACE) 80 MG tablet Take 1 tablet by mouth 2 (Two) Times a Day.   • Tresiba FlexTouch 100 UNIT/ML solution pen-injector injection Inject 44 Units under the skin into the appropriate area as directed Daily.   • TRUE METRIX BLOOD GLUCOSE TEST test strip    • TRUEPLUS LANCETS 30G misc      No current facility-administered medications on file prior to visit.     (Not in a hospital admission)      Results for orders placed during the hospital encounter of 07/01/21    Adult Transthoracic Echo Complete W/ Cont if Necessary Per Protocol    Interpretation Summary  · Normal left ventricular cavity size, wall thickness and wall motion noted.  · Left ventricular ejection fraction appears to be 61 - 65%. Left ventricular systolic function is normal.  · Left ventricular diastolic function is consistent with (grade I) impaired relaxation.  · No significant valvular heart disease  · There is no evidence of pericardial effusion.    Results for orders placed during the hospital encounter of 07/01/21    Stress Test With Myocardial Perfusion One Day    Interpretation Summary  · A stress test was performed following the Segun protocol.  · Baseline ECG of normal sinus  "rhythm noted. Non-specific ST-T wave changes noted.  · Patient walked 6:24 on treadmill, target heart rate was not achieved. Peak heart rate was 123 which is around 83% of predicted maximal heart rate.  · Blood pressure demonstrated a normal response to stress. Heart rate demonstrated a normal response to stress.  · ST segments did not show any diagnostic changes, occasional PACs and PAC pair noted  · Findings consistent with a normal ECG stress test.  · Left ventricular ejection fraction is normal. (Calculated EF = 63%).  · Exercise images show mildly decreased uptake involving the mid anterior wall of the left ventricle, this area shows mild reversibility  · No significant exercise-induced myocardial ischemia was detected.  · Impressions are consistent with a low risk study.          The following portions of the patient's history were reviewed and updated as appropriate: allergies, current medications, past family history, past medical history, past social history, past surgical history and problem list.    Review of Systems   Constitutional: Negative.   HENT: Negative.  Negative for congestion.    Eyes: Negative.    Cardiovascular: Negative.  Negative for chest pain, cyanosis, dyspnea on exertion, irregular heartbeat, leg swelling, near-syncope, orthopnea, palpitations, paroxysmal nocturnal dyspnea and syncope.   Respiratory: Negative.  Negative for shortness of breath.    Hematologic/Lymphatic: Negative.    Musculoskeletal: Negative.    Gastrointestinal: Negative.    Neurological: Negative.  Negative for headaches.          Objective:     /60 (BP Location: Left arm, Patient Position: Sitting, Cuff Size: Adult)   Pulse 69   Ht 180.3 cm (71\")   Wt 102 kg (225 lb)   SpO2 98%   BMI 31.38 kg/m²   Pulmonary:      Effort: Pulmonary effort is normal.      Breath sounds: Normal breath sounds. No stridor. No wheezing. No rhonchi. No rales.   Cardiovascular:      PMI at left midclavicular line. Normal rate. " Regular rhythm. Normal S1. Normal S2.       Murmurs: There is no murmur.      No gallop.  No click. No rub.   Pulses:     Intact distal pulses.   Edema:     Peripheral edema absent.           Lab Review                  Procedures       I personally viewed and interpreted the patient's LAB data         Assessment:     1. Chronic chest pain with low to moderate risk for CAD    2. Persistent atrial fibrillation    3. Chronic anticoagulation with eliquis    4. Encounter for monitoring sotalol therapy    5. Mixed hyperlipidemia          Plan:     Miki is 74 years old white male with multiple risk factors for coronary artery disease he complains of chest tightness and pressure sensation.  Will arrange a stress test for further evaluation.  Last stress test was 3 and half years ago.    He has history of hyperlipidemia.  He is following closely with his PCP and last LDL was 86.  Goal of 70 or below was discussed.    Persistent atrial fibrillation  Sotalol and Eliquis was continued.  No abnormal side effects.    Blood pressure is running low he was advised to decrease lisinopril 2.5 mg daily and discuss further with his PCP keep a record of his blood pressure and let us and his PCP know.    Weight loss was emphasized.  Follow-up scheduled        No follow-ups on file.

## 2024-11-16 NOTE — PROGRESS NOTES
subjective     Chief Complaint   Patient presents with    Atrial Fibrillation     Follow up        Problems Addressed This Visit  1. Chronic chest pain with low to moderate risk for CAD    2. Persistent atrial fibrillation    3. Chronic anticoagulation with eliquis    4. Encounter for monitoring sotalol therapy    5. Mixed hyperlipidemia        History of Present Illness    Miki is 74 years old white male who is here for cardiology follow-up.  Patient has history of persistent atrial fibrillation.  He is maintaining sinus rhythm on sotalol 80 mg twice a day.  No drug side effects.  He is anticoagulant with Eliquis 5 mg every 12 hours.  No abnormal bleeding.    Hyperlipidemia  He is taking Tricor through his PCP.  He is significantly overweight and is trying to lose weight.  He is also diabetic  Will get a copy of lab work for our records.    His blood pressure has been running low.  He is taking lisinopril 5 mg daily.  No syncope or presyncope.    He complains of mild chest pain in substernal area which is not particular related to exertion.  He has multiple risk factors for coronary artery disease.  Last stress test was 3 and half years ago.    Past Surgical History:   Procedure Laterality Date    CARDIOVASCULAR STRESS TEST  2012    CATARACT EXTRACTION Bilateral     CIRCUMCISION N/A 5/22/2023    Procedure: CIRCUMCISION;  Surgeon: Patrice Hung MD;  Location: [facility];  Service: Urology;  Laterality: N/A;    COLONOSCOPY      ECHO - CONVERTED  2012    PROSTATECTOMY  08/27/2019    at [place]      Family History   Problem Relation Age of Onset    No Known Problems Mother     Lung cancer Father     Prostate cancer Brother     Heart disease Sister      Past Medical History:   Diagnosis Date    Anxiety     Arthritis     Cancer 10/2018    Prostate cancer    Diabetes mellitus     Elevated PSA     Encounter for monitoring sotalol therapy     [place]     Hyperlipidemia     Hyperlipidemia      Hypertension     Obesity     Paroxysmal A-fib     Phimosis      Patient Active Problem List   Diagnosis    Persistent atrial fibrillation    Encounter for monitoring sotalol therapy    Essential hypertension    Hyperlipidemia    Obesity    Controlled type 2 diabetes mellitus without complication, without long-term current use of insulin    Prostate cancer    Chronic anticoagulation with eliquis    Multiple risk factors for coronary artery disease    Phimosis    Preoperative clearance       Social History     Tobacco Use    Smoking status: Former     Current packs/day: 0.00     Average packs/day: 0.5 packs/day for 3.0 years (1.5 ttl pk-yrs)     Types: Cigarettes     Start date:      Quit date:      Years since quittin.8    Smokeless tobacco: Former     Types: Chew   Vaping Use    Vaping status: Never Used   Substance Use Topics    Alcohol use: No    Drug use: No       No Known Allergies    Current Outpatient Medications on File Prior to Visit   Medication Sig    apixaban (ELIQUIS) 5 MG tablet tablet Take 1 tablet by mouth 2 (Two) Times a Day. (Patient taking differently: Take 1 tablet by mouth Every 12 (Twelve) Hours.  )    Blood Glucose Monitoring Suppl (TRUE METRIX METER) w/Device kit     clotrimazole-betamethasone (Lotrisone) 1-0.05 % cream Apply 1 application topically to the appropriate area as directed 2 (Two) Times a Day.    EASY COMFORT PEN NEEDLES 31G X 8 MM misc Daily. as directed    fenofibrate (TRICOR) 145 MG tablet Take 1 tablet by mouth Daily.    folic acid (FOLVITE) 1 MG tablet Take 1 tablet by mouth Daily.    lisinopril (PRINIVIL,ZESTRIL) 5 MG tablet Take 1 tablet by mouth Daily. for blood pressure    LORazepam (ATIVAN) 1 MG tablet TAKE ONE TABLET BY MOUTH EVERY DAY AS NEEDED ANXIETY AND OR SLEEP    metFORMIN (GLUCOPHAGE) 500 MG tablet Take 1 tablet by mouth 2 (Two) Times a Day With Meals.    methotrexate 2.5 MG tablet Take 1 tablet by mouth. Take 8 tablets every Friday    pantoprazole  (PROTONIX) 40 MG EC tablet Take 1 tablet by mouth Daily.    sertraline (ZOLOFT) 100 MG tablet Take 1 tablet by mouth Daily.    sotalol (BETAPACE) 80 MG tablet Take 1 tablet by mouth 2 (Two) Times a Day.    Tresiba FlexTouch 100 UNIT/ML solution pen-injector injection Inject 44 Units under the skin into the appropriate area as directed Daily.    TRUE METRIX BLOOD GLUCOSE TEST test strip     TRUEPLUS LANCETS 30G misc      No current facility-administered medications on file prior to visit.     (Not in a hospital admission)      Results for orders placed during the hospital encounter of 07/01/21    Adult Transthoracic Echo Complete W/ Cont if Necessary Per Protocol    Interpretation Summary  · Normal left ventricular cavity size, wall thickness and wall motion noted.  · Left ventricular ejection fraction appears to be 61 - 65%. Left ventricular systolic function is normal.  · Left ventricular diastolic function is consistent with (grade I) impaired relaxation.  · No significant valvular heart disease  · There is no evidence of pericardial effusion.    Results for orders placed during the hospital encounter of 07/01/21    Stress Test With Myocardial Perfusion One Day    Interpretation Summary  · A stress test was performed following the Segun protocol.  · Baseline ECG of normal sinus rhythm noted. Non-specific ST-T wave changes noted.  · Patient walked 6:24 on treadmill, target heart rate was not achieved. Peak heart rate was 123 which is around 83% of predicted maximal heart rate.  · Blood pressure demonstrated a normal response to stress. Heart rate demonstrated a normal response to stress.  · ST segments did not show any diagnostic changes, occasional PACs and PAC pair noted  · Findings consistent with a normal ECG stress test.  · Left ventricular ejection fraction is normal. (Calculated EF = 63%).  · Exercise images show mildly decreased uptake involving the mid anterior wall of the left ventricle, this area shows  "mild reversibility  · No significant exercise-induced myocardial ischemia was detected.  · Impressions are consistent with a low risk study.          The following portions of the patient's history were reviewed and updated as appropriate: allergies, current medications, past family history, past medical history, past social history, past surgical history and problem list.    Review of Systems   Constitutional: Negative.   HENT: Negative.  Negative for congestion.    Eyes: Negative.    Cardiovascular: Negative.  Negative for chest pain, cyanosis, dyspnea on exertion, irregular heartbeat, leg swelling, near-syncope, orthopnea, palpitations, paroxysmal nocturnal dyspnea and syncope.   Respiratory: Negative.  Negative for shortness of breath.    Hematologic/Lymphatic: Negative.    Musculoskeletal: Negative.    Gastrointestinal: Negative.    Neurological: Negative.  Negative for headaches.          Objective:     /60 (BP Location: Left arm, Patient Position: Sitting, Cuff Size: Adult)   Pulse 69   Ht 180.3 cm (71\")   Wt 102 kg (225 lb)   SpO2 98%   BMI 31.38 kg/m²   Pulmonary:      Effort: Pulmonary effort is normal.      Breath sounds: Normal breath sounds. No stridor. No wheezing. No rhonchi. No rales.   Cardiovascular:      PMI at left midclavicular line. Normal rate. Regular rhythm. Normal S1. Normal S2.       Murmurs: There is no murmur.      No gallop.  No click. No rub.   Pulses:     Intact distal pulses.   Edema:     Peripheral edema absent.           Lab Review                  Procedures       I personally viewed and interpreted the patient's LAB data         Assessment:     1. Chronic chest pain with low to moderate risk for CAD    2. Persistent atrial fibrillation    3. Chronic anticoagulation with eliquis    4. Encounter for monitoring sotalol therapy    5. Mixed hyperlipidemia          Plan:     Miki is 74 years old white male with multiple risk factors for coronary artery disease he complains " of chest tightness and pressure sensation.  Will arrange a stress test for further evaluation.  Last stress test was 3 and half years ago.    He has history of hyperlipidemia.  He is following closely with his PCP and last LDL was 86.  Goal of 70 or below was discussed.    Persistent atrial fibrillation  Sotalol and Eliquis was continued.  No abnormal side effects.    Blood pressure is running low he was advised to decrease lisinopril 2.5 mg daily and discuss further with his PCP keep a record of his blood pressure and let us and his PCP know.    Weight loss was emphasized.  Follow-up scheduled        No follow-ups on file.

## 2025-01-17 ENCOUNTER — HOSPITAL ENCOUNTER (OUTPATIENT)
Dept: NUCLEAR MEDICINE | Facility: HOSPITAL | Age: 75
Discharge: HOME | End: 2025-01-17
Payer: MEDICARE

## 2025-01-17 ENCOUNTER — HOSPITAL ENCOUNTER (OUTPATIENT)
Dept: CARDIOLOGY | Facility: HOSPITAL | Age: 75
Discharge: HOME | End: 2025-01-17
Payer: MEDICARE

## 2025-01-17 ENCOUNTER — TELEPHONE (OUTPATIENT)
Dept: CARDIOLOGY | Facility: CLINIC | Age: 75
End: 2025-01-17

## 2025-01-17 DIAGNOSIS — Z91.89 CHRONIC CHEST PAIN WITH LOW TO MODERATE RISK FOR CAD: ICD-10-CM

## 2025-01-17 DIAGNOSIS — G89.29 CHRONIC CHEST PAIN WITH LOW TO MODERATE RISK FOR CAD: ICD-10-CM

## 2025-01-17 DIAGNOSIS — I48.91 ATRIAL FIBRILLATION, UNSPECIFIED TYPE: Primary | ICD-10-CM

## 2025-01-17 DIAGNOSIS — R07.9 CHRONIC CHEST PAIN WITH LOW TO MODERATE RISK FOR CAD: ICD-10-CM

## 2025-01-17 LAB
BH CV NUCLEAR PRIOR STUDY: 3
BH CV REST NUCLEAR ISOTOPE DOSE: 10 MCI
BH CV STRESS BP STAGE 1: NORMAL
BH CV STRESS COMMENTS STAGE 1: NORMAL
BH CV STRESS DOSE REGADENOSON STAGE 1: 0.4
BH CV STRESS DURATION MIN STAGE 1: 0
BH CV STRESS DURATION SEC STAGE 1: 10
BH CV STRESS HR STAGE 1: 82
BH CV STRESS NUCLEAR ISOTOPE DOSE: 30.3 MCI
BH CV STRESS PROTOCOL 1: NORMAL
BH CV STRESS RECOVERY BP: NORMAL MMHG
BH CV STRESS RECOVERY HR: 83 BPM
BH CV STRESS STAGE 1: 1
MAXIMAL PREDICTED HEART RATE: 144 BPM
PERCENT MAX PREDICTED HR: 56.94 %
SPECT HRT GATED+EF W RNC IV: 69 %
STRESS BASELINE BP: NORMAL MMHG
STRESS BASELINE HR: 66 BPM
STRESS PERCENT HR: 67 %
STRESS POST PEAK BP: NORMAL MMHG
STRESS POST PEAK HR: 82 BPM
STRESS TARGET HR: 122 BPM

## 2025-01-17 PROCEDURE — A9500 TC99M SESTAMIBI: HCPCS | Performed by: INTERNAL MEDICINE

## 2025-01-17 PROCEDURE — 78452 HT MUSCLE IMAGE SPECT MULT: CPT

## 2025-01-17 PROCEDURE — 34310000005 TECHNETIUM SESTAMIBI: Performed by: INTERNAL MEDICINE

## 2025-01-17 PROCEDURE — 93017 CV STRESS TEST TRACING ONLY: CPT

## 2025-01-17 PROCEDURE — 25010000002 REGADENOSON 0.4 MG/5ML SOLUTION: Performed by: INTERNAL MEDICINE

## 2025-01-17 RX ORDER — ISOSORBIDE MONONITRATE 30 MG/1
30 TABLET, EXTENDED RELEASE ORAL DAILY
Qty: 30 TABLET | Refills: 1 | Status: SHIPPED | OUTPATIENT
Start: 2025-01-17

## 2025-01-17 RX ORDER — NITROGLYCERIN 0.4 MG/1
TABLET SUBLINGUAL
Qty: 100 TABLET | Refills: 11 | Status: SHIPPED | OUTPATIENT
Start: 2025-01-17

## 2025-01-17 RX ORDER — REGADENOSON 0.08 MG/ML
0.4 INJECTION, SOLUTION INTRAVENOUS
Status: COMPLETED | OUTPATIENT
Start: 2025-01-17 | End: 2025-01-17

## 2025-01-17 RX ADMIN — TECHNETIUM TC 99M SESTAMIBI 1 DOSE: 1 INJECTION INTRAVENOUS at 08:25

## 2025-01-17 RX ADMIN — TECHNETIUM TC 99M SESTAMIBI 1 DOSE: 1 INJECTION INTRAVENOUS at 07:20

## 2025-01-17 RX ADMIN — REGADENOSON 0.4 MG: 0.08 INJECTION, SOLUTION INTRAVENOUS at 08:25

## 2025-01-17 NOTE — TELEPHONE ENCOUNTER
Mike Olson MD Collins, Sheila D, MA  Stress test is abnormal.  Schedule coronary angiography.  Start Imdur 30 mg daily.  Also nitroglycerin 0.4 sublingual as needed.  Need to stop Eliquis 72 hours prior to coronary angiography        Called and given message per Dr Olson.  He voiced understanding.       Rx sent to local pharmacy.

## 2025-01-17 NOTE — TELEPHONE ENCOUNTER
Called [facility] and scheduled a left heart cath with Gracia.  She will get this scheduled and contact the patient.

## 2025-01-17 NOTE — TELEPHONE ENCOUNTER
----- Message from Mike Olson sent at 1/17/2025 12:53 PM EST -----  Stress test is abnormal.  Schedule coronary angiography.  Start Imdur 30 mg daily.  Also nitroglycerin 0.4 sublingual as needed.  Need to stop Eliquis 72 hours prior to coronary angiography

## 2025-01-21 ENCOUNTER — TELEPHONE (OUTPATIENT)
Dept: CARDIOLOGY | Facility: CLINIC | Age: 75
End: 2025-01-21
Payer: MEDICARE

## 2025-01-21 DIAGNOSIS — R94.39 ABNORMAL NUCLEAR STRESS TEST: Primary | ICD-10-CM

## 2025-01-24 ENCOUNTER — TELEPHONE (OUTPATIENT)
Dept: CARDIOLOGY | Facility: CLINIC | Age: 75
End: 2025-01-24
Payer: MEDICARE

## 2025-01-24 NOTE — TELEPHONE ENCOUNTER
Called pt to let him know we have changed the left heart cath order to [facility] with Dr Moya.   Per Zi' message she will get him scheduled with Dr Moya around the first of Feb.

## 2025-01-24 NOTE — TELEPHONE ENCOUNTER
Caller: Miki Jett    Relationship: Self    Best call back number: [phone]     What is the best time to reach you: ANY     Who are you requesting to speak with (clinical staff, provider,  specific staff member): CLINICAL     What was the call regarding: WANTED PT TO HAVE A HEART CATH. THIS WAS SCHD @ [facility] BUT PT IS ASKING THIS BE CHANGED TO [facility]. CHECKING ON THE STATUS OF THIS, WOULD LIKE THIS DONE ASAP.     PT IS ON ELIQUIS, STATES HE NEEDS TO BE OFF THIS FOR PROBABLY 7 DAYS- PLEASE ADVISE.     Is it okay if the provider responds through MyChart: CALL

## 2025-02-03 ENCOUNTER — HOSPITAL ENCOUNTER (OUTPATIENT)
Facility: HOSPITAL | Age: 75
Setting detail: HOSPITAL OUTPATIENT SURGERY
Discharge: HOME | End: 2025-02-03
Attending: INTERNAL MEDICINE | Admitting: INTERNAL MEDICINE
Payer: MEDICARE

## 2025-02-03 ENCOUNTER — PREP FOR SURGERY (OUTPATIENT)
Dept: OTHER | Facility: HOSPITAL | Age: 75
End: 2025-02-03
Payer: MEDICARE

## 2025-02-03 VITALS
RESPIRATION RATE: 22 BRPM | WEIGHT: 221.6 LBS | TEMPERATURE: 96.6 F | DIASTOLIC BLOOD PRESSURE: 65 MMHG | OXYGEN SATURATION: 92 % | HEART RATE: 64 BPM | SYSTOLIC BLOOD PRESSURE: 98 MMHG | HEIGHT: 71 IN | BODY MASS INDEX: 31.02 KG/M2

## 2025-02-03 DIAGNOSIS — I48.91 ATRIAL FIBRILLATION, UNSPECIFIED TYPE: ICD-10-CM

## 2025-02-03 LAB
ANION GAP SERPL CALCULATED.3IONS-SCNC: 12 MMOL/L (ref 5–15)
BUN SERPL-MCNC: 21 MG/DL (ref 8–23)
BUN/CREAT SERPL: 24.4 (ref 7–25)
CALCIUM SPEC-SCNC: 9.4 MG/DL (ref 8.6–10.5)
CHLORIDE SERPL-SCNC: 102 MMOL/L (ref 98–107)
CHOLEST SERPL-MCNC: 145 MG/DL (ref 0–200)
CO2 SERPL-SCNC: 25 MMOL/L (ref 22–29)
CREAT BLDA-MCNC: 1 MG/DL (ref 0.6–1.3)
CREAT SERPL-MCNC: 0.86 MG/DL (ref 0.76–1.27)
DEPRECATED RDW RBC AUTO: 51.4 FL (ref 37–54)
EGFRCR SERPLBLD CKD-EPI 2021: 89.7 ML/MIN/1.73
ERYTHROCYTE [DISTWIDTH] IN BLOOD BY AUTOMATED COUNT: 15.4 % (ref 12.3–15.4)
GLUCOSE SERPL-MCNC: 147 MG/DL (ref 65–99)
HBA1C MFR BLD: 5.2 % (ref 4.8–5.6)
HCT VFR BLD AUTO: 39.9 % (ref 37.5–51)
HDLC SERPL-MCNC: 39 MG/DL (ref 40–60)
HGB BLD-MCNC: 13.1 G/DL (ref 13–17.7)
LDLC SERPL CALC-MCNC: 82 MG/DL (ref 0–100)
LDLC/HDLC SERPL: 2.04 {RATIO}
MCH RBC QN AUTO: 30.1 PG (ref 26.6–33)
MCHC RBC AUTO-ENTMCNC: 32.8 G/DL (ref 31.5–35.7)
MCV RBC AUTO: 91.7 FL (ref 79–97)
PLATELET # BLD AUTO: 216 10*3/MM3 (ref 140–450)
PMV BLD AUTO: 10 FL (ref 6–12)
POTASSIUM SERPL-SCNC: 4.5 MMOL/L (ref 3.5–5.2)
RBC # BLD AUTO: 4.35 10*6/MM3 (ref 4.14–5.8)
SODIUM SERPL-SCNC: 139 MMOL/L (ref 136–145)
TRIGL SERPL-MCNC: 133 MG/DL (ref 0–150)
VLDLC SERPL-MCNC: 24 MG/DL (ref 5–40)
WBC NRBC COR # BLD AUTO: 11.63 10*3/MM3 (ref 3.4–10.8)

## 2025-02-03 PROCEDURE — C1769 GUIDE WIRE: HCPCS | Performed by: INTERNAL MEDICINE

## 2025-02-03 PROCEDURE — 36415 COLL VENOUS BLD VENIPUNCTURE: CPT

## 2025-02-03 PROCEDURE — 25010000002 LIDOCAINE PF 1% 1 % SOLUTION: Performed by: INTERNAL MEDICINE

## 2025-02-03 PROCEDURE — 25010000002 HEPARIN (PORCINE) PER 1000 UNITS: Performed by: INTERNAL MEDICINE

## 2025-02-03 PROCEDURE — 93458 L HRT ARTERY/VENTRICLE ANGIO: CPT | Performed by: INTERNAL MEDICINE

## 2025-02-03 PROCEDURE — 25010000002 NICARDIPINE 2.5 MG/ML SOLUTION: Performed by: INTERNAL MEDICINE

## 2025-02-03 PROCEDURE — 80061 LIPID PANEL: CPT | Performed by: INTERNAL MEDICINE

## 2025-02-03 PROCEDURE — 25010000002 MIDAZOLAM PER 1 MG: Performed by: INTERNAL MEDICINE

## 2025-02-03 PROCEDURE — C1894 INTRO/SHEATH, NON-LASER: HCPCS | Performed by: INTERNAL MEDICINE

## 2025-02-03 PROCEDURE — 82565 ASSAY OF CREATININE: CPT

## 2025-02-03 PROCEDURE — 80048 BASIC METABOLIC PNL TOTAL CA: CPT | Performed by: INTERNAL MEDICINE

## 2025-02-03 PROCEDURE — 85027 COMPLETE CBC AUTOMATED: CPT | Performed by: INTERNAL MEDICINE

## 2025-02-03 PROCEDURE — 25810000003 SODIUM CHLORIDE 0.9 % SOLUTION: Performed by: NURSE PRACTITIONER

## 2025-02-03 PROCEDURE — 83036 HEMOGLOBIN GLYCOSYLATED A1C: CPT | Performed by: INTERNAL MEDICINE

## 2025-02-03 PROCEDURE — 25510000001 IOPAMIDOL PER 1 ML: Performed by: INTERNAL MEDICINE

## 2025-02-03 RX ORDER — ROSUVASTATIN CALCIUM 10 MG/1
10 TABLET, COATED ORAL DAILY
Qty: 90 TABLET | Refills: 3 | Status: SHIPPED | OUTPATIENT
Start: 2025-02-03

## 2025-02-03 RX ORDER — LIDOCAINE HYDROCHLORIDE 10 MG/ML
INJECTION, SOLUTION EPIDURAL; INFILTRATION; INTRACAUDAL; PERINEURAL
Status: DISCONTINUED | OUTPATIENT
Start: 2025-02-03 | End: 2025-02-03 | Stop reason: HOSPADM

## 2025-02-03 RX ORDER — SODIUM CHLORIDE 9 MG/ML
100 INJECTION, SOLUTION INTRAVENOUS CONTINUOUS
Status: DISCONTINUED | OUTPATIENT
Start: 2025-02-03 | End: 2025-02-03 | Stop reason: HOSPADM

## 2025-02-03 RX ORDER — SODIUM CHLORIDE 0.9 % (FLUSH) 0.9 %
10 SYRINGE (ML) INJECTION AS NEEDED
Status: DISCONTINUED | OUTPATIENT
Start: 2025-02-03 | End: 2025-02-03 | Stop reason: HOSPADM

## 2025-02-03 RX ORDER — NITROGLYCERIN 0.4 MG/1
0.4 TABLET SUBLINGUAL
Status: DISCONTINUED | OUTPATIENT
Start: 2025-02-03 | End: 2025-02-03 | Stop reason: HOSPADM

## 2025-02-03 RX ORDER — SODIUM CHLORIDE 9 MG/ML
40 INJECTION, SOLUTION INTRAVENOUS AS NEEDED
Status: DISCONTINUED | OUTPATIENT
Start: 2025-02-03 | End: 2025-02-03 | Stop reason: HOSPADM

## 2025-02-03 RX ORDER — ASPIRIN 81 MG/1
81 TABLET ORAL DAILY
Qty: 100 TABLET | Refills: 1 | Status: SHIPPED | OUTPATIENT
Start: 2025-02-03

## 2025-02-03 RX ORDER — ACETAMINOPHEN 325 MG/1
650 TABLET ORAL EVERY 4 HOURS PRN
Status: DISCONTINUED | OUTPATIENT
Start: 2025-02-03 | End: 2025-02-03 | Stop reason: HOSPADM

## 2025-02-03 RX ORDER — IOPAMIDOL 755 MG/ML
INJECTION, SOLUTION INTRAVASCULAR
Status: DISCONTINUED | OUTPATIENT
Start: 2025-02-03 | End: 2025-02-03 | Stop reason: HOSPADM

## 2025-02-03 RX ORDER — HEPARIN SODIUM 1000 [USP'U]/ML
INJECTION, SOLUTION INTRAVENOUS; SUBCUTANEOUS
Status: DISCONTINUED | OUTPATIENT
Start: 2025-02-03 | End: 2025-02-03 | Stop reason: HOSPADM

## 2025-02-03 RX ORDER — MIDAZOLAM HYDROCHLORIDE 1 MG/ML
INJECTION, SOLUTION INTRAMUSCULAR; INTRAVENOUS
Status: DISCONTINUED | OUTPATIENT
Start: 2025-02-03 | End: 2025-02-03 | Stop reason: HOSPADM

## 2025-02-03 RX ORDER — ONDANSETRON 2 MG/ML
4 INJECTION INTRAMUSCULAR; INTRAVENOUS EVERY 6 HOURS PRN
Status: DISCONTINUED | OUTPATIENT
Start: 2025-02-03 | End: 2025-02-03 | Stop reason: HOSPADM

## 2025-02-03 RX ORDER — SODIUM CHLORIDE 0.9 % (FLUSH) 0.9 %
10 SYRINGE (ML) INJECTION EVERY 12 HOURS SCHEDULED
Status: DISCONTINUED | OUTPATIENT
Start: 2025-02-03 | End: 2025-02-03 | Stop reason: HOSPADM

## 2025-02-03 RX ORDER — NICARDIPINE HYDROCHLORIDE 2.5 MG/ML
INJECTION INTRAVENOUS
Status: DISCONTINUED | OUTPATIENT
Start: 2025-02-03 | End: 2025-02-03 | Stop reason: HOSPADM

## 2025-02-03 RX ADMIN — SODIUM CHLORIDE 303 ML: 9 INJECTION, SOLUTION INTRAVENOUS at 10:17

## 2025-02-03 NOTE — Clinical Note
Inserted under fluoro. Additional Notes: patient to consider chemical peel with Imelda PADILLA Detail Level: Zone Render Risk Assessment In Note?: yes

## 2025-02-03 NOTE — H&P
Miki Yonathan Jett  [id number]  1950   LOS: 0 days   Patient Care Team:  Blanka Grimm APRN as PCP - General  Jonny Carrasquillo MD as Consulting Physician (Gastroenterology)  Mike Olson MD as Consulting Physician (Cardiology)  Patrice Hung MD as Consulting Physician (Urology)    Mr. Jett is a 74-year-old  white male from [place], [place].    Chief Complaint:  CAD    Problem List:  Coronary artery disease  Stress test 2012-data deficit  Echocardiogram 7/1/2021: LVEF 61 to 65%, grade 1 diastolic dysfunction, no significant valvular heart disease  Stress test 7/1/2021: LVEF 63%, no significant exercise-induced myocardial ischemia detected.  Low risk study  Regadenoson stress test 1/17/2025:Myocardial perfusion imaging indicates a moderate-sized, moderately severe area of ischemia located in the anterior wall.  LVEF 69%.  Persistent atrial fibrillation  CHADsVasc 4, anticoagulated with Eliquis  On sotalol therapy  Hypertension   Hyperlipidemia  Type 2 diabetes mellitus; hemoglobin A1c 7.5% August 2024  Surgical history:  Bilateral cataracts  Circumcision  Prostatectomy    No Known Allergies  Medications Prior to Admission   Medication Sig Dispense Refill Last Dose/Taking    apixaban (ELIQUIS) 5 MG tablet tablet Take 1 tablet by mouth 2 (Two) Times a Day. (Patient taking differently: Take 1 tablet by mouth Every 12 (Twelve) Hours.  ) 60 tablet 2     Blood Glucose Monitoring Suppl (TRUE METRIX METER) w/Device kit        clotrimazole-betamethasone (Lotrisone) 1-0.05 % cream Apply 1 application topically to the appropriate area as directed 2 (Two) Times a Day. 45 g 2     EASY COMFORT PEN NEEDLES 31G X 8 MM misc Daily. as directed  0     fenofibrate (TRICOR) 145 MG tablet Take 1 tablet by mouth Daily.       folic acid (FOLVITE) 1 MG tablet Take 1 tablet by mouth Daily.       isosorbide mononitrate (IMDUR) 30 MG 24 hr tablet Take 1 tablet by mouth Daily. 30 tablet 1      lisinopril (PRINIVIL,ZESTRIL) 5 MG tablet Take 1 tablet by mouth Daily. for blood pressure       LORazepam (ATIVAN) 1 MG tablet TAKE ONE TABLET BY MOUTH EVERY DAY AS NEEDED ANXIETY AND OR SLEEP       metFORMIN (GLUCOPHAGE) 500 MG tablet Take 1 tablet by mouth 2 (Two) Times a Day With Meals.       methotrexate 2.5 MG tablet Take 1 tablet by mouth. Take 8 tablets every Friday       nitroglycerin (NITROSTAT) 0.4 MG SL tablet 1 under the tongue as needed for angina, may repeat q5mins for up three doses 100 tablet 11     pantoprazole (PROTONIX) 40 MG EC tablet Take 1 tablet by mouth Daily.  3     sertraline (ZOLOFT) 100 MG tablet Take 1 tablet by mouth Daily.       sotalol (BETAPACE) 80 MG tablet Take 1 tablet by mouth 2 (Two) Times a Day.       Tresiba FlexTouch 100 UNIT/ML solution pen-injector injection Inject 44 Units under the skin into the appropriate area as directed Daily.       TRUE METRIX BLOOD GLUCOSE TEST test strip        TRUEPLUS LANCETS 30G misc         Scheduled Meds:  Continuous Infusions:No current facility-administered medications for this encounter.    PRN Meds:.       History of Present Illness:   Mr. Jett is a 74-year-old white male who presents to [facility] 2/3/2025 for LHC +/-CBI.  Patient had an abnormal stress test 2025 showing moderate-sized moderately severe area of ischemia in the anterior wall.  Last dose of Eliquis was 2025.  Patient says he does not have any chest pain or shortness of breath.    Cardiac risk factors: advanced age (older than 55 for men, 65 for women), diabetes mellitus, dyslipidemia, hypertension, male gender, obesity (BMI >= 30 kg/m2), and sedentary lifestyle.    Social History     Socioeconomic History    Marital status:    Tobacco Use    Smoking status: Former     Current packs/day: 0.00     Average packs/day: 0.5 packs/day for 3.0 years (1.5 ttl pk-yrs)     Types: Cigarettes     Start date:      Quit date:      Years since quittin.1     Smokeless tobacco: Former     Types: Chew   Vaping Use    Vaping status: Never Used   Substance and Sexual Activity    Alcohol use: No    Drug use: No    Sexual activity: Defer     Partners: Female     Family History   Problem Relation Age of Onset    No Known Problems Mother     Lung cancer Father     Prostate cancer Brother     Heart disease Sister        Review of Systems  10 point review of systems was completed, positives outlined in the HPI, and otherwise all other systems are negative.      Objective:       Physical Exam  There were no vitals taken for this visit.  There were no vitals filed for this visit.  There is no height or weight on file to calculate BMI.  No intake or output data in the 24 hours ending 02/03/25 0945  Bp 144/92, 71 bpm, RR 14, 96% O2 on room air  General Appearance:  Alert, cooperative, no distress, appears stated age   Head:  Normocephalic, without obvious abnormality, atraumatic   Neck: Supple, symmetrical, trachea midline, no adenopathy, thyroid: not enlarged, symmetric, no tenderness/mass/nodules, no carotid bruit or JVD   Lungs:   Clear to auscultation bilaterally, respirations unlabored   Heart:  Regular rate and rhythm, S1, S2 normal, no murmur, rub or gallop   Abdomen:   Soft, nontender, no masses, no organomegaly, bowel sounds audible x4   Extremities: No edema, normal range of motion   Pulses: 2+ and symmetric   Skin: Skin color, texture, turgor normal, no rashes or lesions   Neurologic: Normal   Good right Peter's test    Cardiographics  EKG: Normal sinus rhythm on telemetry    Imaging  Chest x-ray: No new chest x-ray to review    Lab Review                           Assessment:    Patient had an abnormal stress test January 2025 showing moderate-sized moderately severe area of ischemia in the anterior wall.  Left heart catheterization procedure, risks, and complications discussed with the patient and he is agreeable to proceed. Last dose of Eliquis was 1/30/2025.           Plan:   [facility]  Planned access: Right radial artery      Electronically signed by CHINO Ortega, 02/03/25, 10:03 AM EST.

## 2025-02-03 NOTE — Clinical Note
Hemostasis started on the right radial artery. R-Band was used in achieving hemostasis. Radial compression device applied to vessel. Hemostasis achieved successfully. Closure device additional comment: 14ml

## 2025-03-14 RX ORDER — ISOSORBIDE MONONITRATE 30 MG/1
30 TABLET, EXTENDED RELEASE ORAL DAILY
Qty: 30 TABLET | Refills: 2 | Status: SHIPPED | OUTPATIENT
Start: 2025-03-14

## 2025-05-13 ENCOUNTER — OFFICE VISIT (OUTPATIENT)
Dept: CARDIOLOGY | Facility: CLINIC | Age: 75
End: 2025-05-13
Payer: MEDICARE

## 2025-05-13 VITALS
OXYGEN SATURATION: 95 % | BODY MASS INDEX: 31.25 KG/M2 | DIASTOLIC BLOOD PRESSURE: 64 MMHG | WEIGHT: 223.2 LBS | HEIGHT: 71 IN | SYSTOLIC BLOOD PRESSURE: 128 MMHG | HEART RATE: 67 BPM

## 2025-05-13 DIAGNOSIS — E78.2 MIXED HYPERLIPIDEMIA: ICD-10-CM

## 2025-05-13 DIAGNOSIS — I10 ESSENTIAL HYPERTENSION: ICD-10-CM

## 2025-05-13 DIAGNOSIS — Z79.01 CHRONIC ANTICOAGULATION: ICD-10-CM

## 2025-05-13 DIAGNOSIS — I48.19 PERSISTENT ATRIAL FIBRILLATION: Primary | ICD-10-CM

## 2025-05-13 NOTE — PROGRESS NOTES
subjective     Chief Complaint   Patient presents with    Follow-up       Problems Addressed This Visit  1. Persistent atrial fibrillation    2. Essential hypertension    3. Mixed hyperlipidemia    4. Chronic anticoagulation with eliquis        History of Present Illness  History of Present Illness  The patient is a 74-year-old white male who presents for a cardiology follow-up.    He has a history of atrial fibrillation discovered in 2006 in [place], [place], and was started on sotalol. He has been taking sotalol since then and has been doing very well, maintaining sinus rhythm. He is anticoagulated with Eliquis and reports no abnormal bleeding or bruising. At this time, he is asymptomatic with no chest pain, palpitations, shortness of breath, orthopnea, PND, or ankle edema.    He also has a history of hyperlipidemia and is taking Crestor 10 mg daily, which he tolerates well.    His hypertension is well controlled with lisinopril 5 mg daily, with no reported side effects.    He has nonobstructive coronary artery disease and was advised to discontinue Imdur as he has been totally asymptomatic.    He has diabetes mellitus and obesity. He has been trying to lose weight and has lost around 20 pounds.    MEDICATIONS  Sotalol, Eliquis, Crestor, lisinopril, Imdur (discontinued)      Past Surgical History:   Procedure Laterality Date    CARDIAC CATHETERIZATION N/A 2/3/2025    Procedure: Left Heart Cath;  Surgeon: Renetta Moya MD;  Location:  [facility];  Service: Cardiovascular;  Laterality: N/A;    CARDIOVASCULAR STRESS TEST  2012    CATARACT EXTRACTION Bilateral     CIRCUMCISION N/A 5/22/2023    Procedure: CIRCUMCISION;  Surgeon: Patrice Hung MD;  Location:  [facility];  Service: Urology;  Laterality: N/A;    COLONOSCOPY      ECHO - CONVERTED  2012    PROSTATECTOMY  08/27/2019    at [facility]      Family History   Problem Relation Age of Onset    No Known Problems Mother     Lung cancer  Father     Prostate cancer Brother     Heart disease Sister      Past Medical History:   Diagnosis Date    Anxiety     Arthritis     Cancer 10/2018    Prostate cancer    Diabetes mellitus     Elevated PSA     Encounter for monitoring sotalol therapy     [place] (hard of hearing)     Hyperlipidemia     Hyperlipidemia     Hypertension     Obesity     Paroxysmal A-fib     Phimosis      Patient Active Problem List   Diagnosis    Persistent atrial fibrillation    Encounter for monitoring sotalol therapy    Essential hypertension    Hyperlipidemia    Obesity    Controlled type 2 diabetes mellitus without complication, without long-term current use of insulin    Prostate cancer    Chronic anticoagulation with eliquis    Multiple risk factors for coronary artery disease    Phimosis    Preoperative clearance    Atrial fibrillation    Abnormal nuclear stress test       Social History     Tobacco Use    Smoking status: Former     Current packs/day: 0.00     Average packs/day: 0.5 packs/day for 3.0 years (1.5 ttl pk-yrs)     Types: Cigarettes     Start date:      Quit date:      Years since quittin.3     Passive exposure: Never    Smokeless tobacco: Former     Types: Chew   Vaping Use    Vaping status: Never Used   Substance Use Topics    Alcohol use: No    Drug use: No       No Known Allergies    Current Outpatient Medications on File Prior to Visit   Medication Sig    apixaban (ELIQUIS) 5 MG tablet tablet Take 1 tablet by mouth 2 (Two) Times a Day.    aspirin 81 MG EC tablet Take 1 tablet by mouth Daily.    Blood Glucose Monitoring Suppl (TRUE METRIX METER) w/Device kit     clotrimazole-betamethasone (Lotrisone) 1-0.05 % cream Apply 1 application topically to the appropriate area as directed 2 (Two) Times a Day.    EASY COMFORT PEN NEEDLES 31G X 8 MM misc Daily. as directed    fenofibrate (TRICOR) 145 MG tablet Take 1 tablet by mouth Daily.    folic acid (FOLVITE) 1 MG tablet Take 1 tablet by mouth Daily.     lisinopril (PRINIVIL,ZESTRIL) 5 MG tablet Take 1 tablet by mouth Daily. for blood pressure    LORazepam (ATIVAN) 1 MG tablet TAKE ONE TABLET BY MOUTH EVERY DAY AS NEEDED ANXIETY AND OR SLEEP    metFORMIN (GLUCOPHAGE) 500 MG tablet Take 1 tablet by mouth 2 (Two) Times a Day With Meals.    methotrexate 2.5 MG tablet Take 1 tablet by mouth. Take 8 tablets every Friday    nitroglycerin (NITROSTAT) 0.4 MG SL tablet 1 under the tongue as needed for angina, may repeat q5mins for up three doses    rosuvastatin (CRESTOR) 10 MG tablet Take 1 tablet by mouth Daily.    sertraline (ZOLOFT) 100 MG tablet Take 1 tablet by mouth Daily.    sotalol (BETAPACE) 80 MG tablet Take 1 tablet by mouth 2 (Two) Times a Day.    Tresiba FlexTouch 100 UNIT/ML solution pen-injector injection Inject 44 Units under the skin into the appropriate area as directed Daily.    TRUE METRIX BLOOD GLUCOSE TEST test strip     TRUEPLUS LANCETS 30G misc     [DISCONTINUED] isosorbide mononitrate (IMDUR) 30 MG 24 hr tablet TAKE 1 TABLET BY MOUTH EVERY DAY     No current facility-administered medications on file prior to visit.     (Not in a hospital admission)      Results for orders placed during the hospital encounter of 07/01/21    Adult Transthoracic Echo Complete W/ Cont if Necessary Per Protocol    Interpretation Summary  · Normal left ventricular cavity size, wall thickness and wall motion noted.  · Left ventricular ejection fraction appears to be 61 - 65%. Left ventricular systolic function is normal.  · Left ventricular diastolic function is consistent with (grade I) impaired relaxation.  · No significant valvular heart disease  · There is no evidence of pericardial effusion.    Results for orders placed during the hospital encounter of 01/17/25    Stress Test With Myocardial Perfusion One Day    Interpretation Summary  Images from the original result were not included.      A pharmacological stress test was performed using regadenoson without low-level  exercise.    Resting EKG showed sinus rhythm at a rate of 66 bpm.  EKG was normal.    ST segments did not show any diagnostic changes.  No significant arrhythmia detected.    Exercise images showed a moderate-sized filling defect with moderately decreased isotope uptake involving the apical anterior and mid anterior segment of the left ventricle with significant reversibility on delayed images.TID 1.1    Myocardial perfusion imaging indicates a moderate-sized, moderately severe area of ischemia located in the anterior wall.    Left ventricular ejection fraction is normal (Calculated EF = 69%).    Impressions are consistent with a high risk study.    Compared to the prior study from 7/1/2021 the current study reveals significant change.    Results for orders placed during the hospital encounter of 02/03/25    Cardiac Catheterization/Vascular Study    Conclusion  FINAL    Impression  Noncritical moderate diffuse disease of the LAD.  Mild nonobstructive disease of the circumflex and the RCA.  Normal left ventricular systolic function, estimated EF 65%.    RECOMMENDATIONS:  The patient denies any symptoms of angina and has fair exercise capacity.  Despite the abnormal myocardial perfusion study the risk/benefit of extensive stenting of the LAD is not favorable.  Continued medical therapy and risk factor management is recommended.  At this time we will add low-dose aspirin and statin therapy.  In case of anginal symptoms and anginal therapy should be optimized.    Indications: Abnormal myocardial perfusion study.    Access: Right radial    Procedures:  Left heart catheterization.  Left ventriculogram.  Selective coronary angiography.  Arterial site hemostasis with radial band.    Procedure narrative:  The patient was brought to the catheterization lab in a fasting condition.  Access site was prepped and draped in standard sterile fashion.  Lidocaine was injected and arterial access was obtained by percutaneous anterior  wall puncture technique.  A 6 English arterial sheath was placed. Above procedures were performed without complications.  At the conclusion the arterial sheath was removed and hemostasis was achieved.  The patient was transferred to the unit in a stable condition.    Hemodynamic Findings:  Heart Rate: 76/minute.  LV pressure: 107/3-12 mmHg, on pull back no gradient was recorded across the aortic valve.    Angiographic Findings:  Right coronary dominance.  LM: 20% eccentric plaque without occlusive disease.  LAD: 30% proximal stenosis at the site of origin of first septal and first diagonal.  After that there is mild plaque followed by a 50% focal in the midsegment followed by diffuse mid to distal plaque with sequential 60 and 40% lesions.  The medium to large diagonal branch has 40% diffuse proximal to mid vessel plaque.  LCx: 40% ostial plaque.  40 to 50% stenosis of the small to medium sized first marginal.  No critical disease is noted.  Ramus intermedius: Large vessel with minor irregularities without hemodynamic significant disease.  RCA: Dominant vessel with long segment of 30 to 40% irregular proximal plaque without hemodynamic significant disease.  LV: Left ventriculogram performed in 30 BREWER projection revealed normal global and regional left ventricular systolic function with estimated ejection fraction of 65%.  No mitral regurgitation was noted.    Complications: No acute procedure related complications.        The following portions of the patient's history were reviewed and updated as appropriate: allergies, current medications, past family history, past medical history, past social history, past surgical history and problem list.    Review of Systems   Constitutional: Negative.   HENT: Negative.  Negative for congestion.    Eyes: Negative.    Cardiovascular: Negative.  Negative for chest pain, cyanosis, dyspnea on exertion, irregular heartbeat, leg swelling, near-syncope, orthopnea, palpitations,  "paroxysmal nocturnal dyspnea and syncope.   Respiratory: Negative.  Negative for shortness of breath.    Hematologic/Lymphatic: Negative.    Musculoskeletal: Negative.    Gastrointestinal: Negative.    Neurological: Negative.  Negative for headaches.          Objective:     /64 (BP Location: Left arm, Patient Position: Sitting, Cuff Size: Adult)   Pulse 67   Ht 180.3 cm (71\")   Wt 101 kg (223 lb 3.2 oz)   SpO2 95%   BMI 31.13 kg/m²   Pulmonary:      Effort: Pulmonary effort is normal.      Breath sounds: Normal breath sounds. No stridor. No wheezing. No rhonchi. No rales.   Cardiovascular:      PMI at left midclavicular line. Normal rate. Regular rhythm. Normal S1. Normal S2.       Murmurs: There is no murmur.      No gallop.  No click. No rub.   Pulses:     Intact distal pulses.   Edema:     Peripheral edema absent.       Physical Exam        Lab Review  Lab Results   Component Value Date     02/03/2025    K 4.5 02/03/2025     02/03/2025    BUN 21 02/03/2025    CREATININE 1.00 02/03/2025    GLUCOSE 147 (H) 02/03/2025     (H) 08/02/2019    CALCIUM 9.4 02/03/2025    ALT 25 03/02/2015    ALKPHOS 83 03/02/2015     No results found for: \"CKTOTAL\"  Lab Results   Component Value Date    WBC 11.63 (H) 02/03/2025    HGB 13.1 02/03/2025    HCT 39.9 02/03/2025     02/03/2025     No results found for: \"INR\"  Lab Results   Component Value Date    MG 2.2 09/02/2014     Lab Results   Component Value Date    PSA <0.10 10/21/2019    TSH 3.282 03/02/2015     No results found for: \"BNP\"  Lab Results   Component Value Date    CHLPL 166 03/02/2015    CHOL 145 02/03/2025    TRIG 133 02/03/2025    HDL 39 (L) 02/03/2025    VLDL 24 02/03/2025    LDL 82 02/03/2025     Lab Results   Component Value Date    LDL 82 02/03/2025    LDL 63 03/02/2015     No results found for: \"PROBNP\"              ECG 12 Lead    Date/Time: 5/13/2025 12:41 PM  Performed by: Mike Olson MD    Authorized by: [place], " Mike Howe MD  Comparison: compared with previous ECG from 5/15/2024  Similar to previous ECG  Rhythm: sinus rhythm  Rate: normal  BPM: 68  Conduction: conduction normal  QRS axis: normal  Other findings: non-specific ST-T wave changes    Clinical impression: normal ECG  Comments: QTc 457          Results       I personally viewed and interpreted the patient's LAB data         Assessment:     1. Persistent atrial fibrillation    2. Essential hypertension    3. Mixed hyperlipidemia    4. Chronic anticoagulation with eliquis        Assessment & Plan  1. Persistent atrial fibrillation.  He is maintaining sinus rhythm on sotalol. The QT interval has been normal. No side effects such as abnormal bleeding or bruising have been observed. Sotalol will be continued. Eliquis 5 mg twice daily will be continued.    2. Hyperlipidemia.  He is currently on Crestor 10 mg daily and Tricor 145 mg daily  Weight loss, dietary restrictions, and exercise were discussed. He is losing some weight.    3. Hypertension.  His hypertension is very well controlled with lisinopril 5 mg daily. No drug side effects have been reported.    4. Nonobstructive coronary artery disease.  He was advised to discontinue Imdur as he has been totally asymptomatic.    5. Diabetes mellitus and obesity.  He has been trying to lose weight and has lost around 20 pounds.               No follow-ups on file.

## (undated) DEVICE — PK CATH CARD 10

## (undated) DEVICE — MODEL AT P65, P/N 701554-001KIT CONTENTS: HAND CONTROLLER, 3-WAY HIGH-PRESSURE STOPCOCK WITH ROTATING END AND PREMIUM HIGH-PRESSURE TUBING: Brand: ANGIOTOUCH® KIT

## (undated) DEVICE — DRAPE,LAPAROTOMY,PED,STERILE: Brand: MEDLINE

## (undated) DEVICE — PATIENT RETURN ELECTRODE, SINGLE-USE, CONTACT QUALITY MONITORING, ADULT, WITH 9FT CORD, FOR PATIENTS WEIGING OVER 33LBS. (15KG): Brand: MEGADYNE

## (undated) DEVICE — SUT GUT CHRM 3/0 SH 27IN G122H

## (undated) DEVICE — MODEL BT2000 P/N 700287-012KIT CONTENTS: MANIFOLD WITH SALINE AND CONTRAST PORTS, SALINE TUBING WITH SPIKE AND HAND SYRINGE, TRANSDUCER: Brand: BT2000 AUTOMATED MANIFOLD KIT

## (undated) DEVICE — HOLDER: Brand: DEROYAL

## (undated) DEVICE — CATH DIAG EXPO .045 FL3  5F 100CM

## (undated) DEVICE — GW INQWIRE FC PTFE STD J/1.5 .035 260

## (undated) DEVICE — PENCL ES MEGADINE EZ/CLEAN BUTN W/HOLSTR 10FT

## (undated) DEVICE — PK BASIC 70

## (undated) DEVICE — INTRO SHEATH PRELUDE IDEAL SPRNG COIL 021 6F 23X80CM

## (undated) DEVICE — TR BAND RADIAL ARTERY COMPRESSION DEVICE: Brand: TR BAND

## (undated) DEVICE — GLV SURG PREMIERPRO MIC LTX PF SZ8 BRN

## (undated) DEVICE — PREMIUM WET SKIN PREP TRAY: Brand: MEDLINE INDUSTRIES, INC.

## (undated) DEVICE — ADULT, W/LG. BACK PAD, RADIOTRANSPARENT ELEMENT AND LEAD WIRE COMPATIBLE W/: Brand: DEFIBRILLATION ELECTRODES

## (undated) DEVICE — GOWN,REINF,POLY,ECL,PP SLV,XXL: Brand: MEDLINE

## (undated) DEVICE — CATH DIAG EXPO M/ PK 5F FL4/FR4 PIG

## (undated) DEVICE — BNDG GZ SOF-FORM CONFRM 2X75IN LF STRL

## (undated) DEVICE — SUT GUT CHRM 4/0 RB1 27IN U203H

## (undated) DEVICE — ELECTRD NDL EZ CLN MOD 2.75IN